# Patient Record
Sex: FEMALE | Race: BLACK OR AFRICAN AMERICAN | HISPANIC OR LATINO | Employment: FULL TIME | URBAN - METROPOLITAN AREA
[De-identification: names, ages, dates, MRNs, and addresses within clinical notes are randomized per-mention and may not be internally consistent; named-entity substitution may affect disease eponyms.]

---

## 2022-02-01 ENCOUNTER — TELEPHONE (OUTPATIENT)
Dept: OTHER | Facility: OTHER | Age: 27
End: 2022-02-01

## 2022-02-16 ENCOUNTER — OFFICE VISIT (OUTPATIENT)
Dept: FAMILY MEDICINE CLINIC | Facility: CLINIC | Age: 27
End: 2022-02-16
Payer: COMMERCIAL

## 2022-02-16 VITALS
WEIGHT: 194.8 LBS | BODY MASS INDEX: 31.31 KG/M2 | HEIGHT: 66 IN | TEMPERATURE: 99 F | HEART RATE: 75 BPM | DIASTOLIC BLOOD PRESSURE: 78 MMHG | RESPIRATION RATE: 16 BRPM | SYSTOLIC BLOOD PRESSURE: 124 MMHG | OXYGEN SATURATION: 98 %

## 2022-02-16 DIAGNOSIS — Z00.00 ANNUAL PHYSICAL EXAM: Primary | ICD-10-CM

## 2022-02-16 PROCEDURE — 3008F BODY MASS INDEX DOCD: CPT | Performed by: FAMILY MEDICINE

## 2022-02-16 PROCEDURE — 99385 PREV VISIT NEW AGE 18-39: CPT | Performed by: FAMILY MEDICINE

## 2022-02-16 PROCEDURE — 3725F SCREEN DEPRESSION PERFORMED: CPT | Performed by: FAMILY MEDICINE

## 2022-02-16 PROCEDURE — 1036F TOBACCO NON-USER: CPT | Performed by: FAMILY MEDICINE

## 2022-02-16 NOTE — PROGRESS NOTES
1 [facility]    NAME: Estella Gavin  AGE: 27 y o  SEX: female  : 1995     DATE: 2022     Assessment and Plan:     Problem List Items Addressed This Visit        Other    Annual physical exam - Primary     Annual physical exam completed at this time  Patient is overall doing well  Will obtain TSH, lipid panel, hemoglobin A1c, vitamin-D, CMP for evaluation         Relevant Orders    Hepatitis C antibody    HIV 1/2 Antigen/Antibody (4th Generation) w Reflex [facility]    BMI 31 0-31 9,adult     Discussed lifestyle modification to achieve a healthy body weight  Patient is currently learning to cook, explaining specific regarding portion control and serving size  Obtain blood work for evaluation of etiology other than color excess for weight gain         Relevant Orders    TSH, 3rd generation with Free T4 reflex    Comprehensive metabolic panel    HEMOGLOBIN A1C W/ EAG ESTIMATION    Lipid Panel with Direct LDL reflex    Vitamin D 25 hydroxy          Immunizations and preventive care screenings were discussed with patient today  Appropriate education was printed on patient's after visit summary  Counseling:  Alcohol/drug use: discussed moderation in alcohol intake, the recommendations for healthy alcohol use, and avoidance of illicit drug use  Dental Health: discussed importance of regular tooth brushing, flossing, and dental visits  Injury prevention: discussed safety/seat belts, safety helmets, smoke detectors, carbon dioxide detectors, and smoking near bedding or upholstery  Sexual health: discussed sexually transmitted diseases, partner selection, use of condoms, avoidance of unintended pregnancy, and contraceptive alternatives  · Exercise: the importance of regular exercise/physical activity was discussed  Recommend exercise 3-5 times per week for at least 30 minutes  BMI Counseling: Body mass index is 31 44 kg/m²   The BMI is above normal  Nutrition recommendations include decreasing portion sizes, encouraging healthy choices of fruits and vegetables, consuming healthier snacks and moderation in carbohydrate intake  Exercise recommendations include exercising 3-5 times per week  Rationale for BMI follow-up plan is due to patient being overweight or obese  Depression Screening and Follow-up Plan: Patient was screened for depression during today's encounter  They screened negative with a PHQ-2 score of 0  Patient report having received HPV vaccine in the past    No follow-ups on file  Chief Complaint:     Chief Complaint   Patient presents with    [facility]      History of Present Illness:     Adult Annual Physical   Patient here for a comprehensive physical exam  The patient reports no problems  Diet and Physical Activity  · Diet/Nutrition: limited junk food, consuming 3-5 servings of fruits/vegetables daily and High carb and lots of pasta and sandwiches  · Exercise: 1-2 times a week on average and 30-60 minutes on average  Depression Screening  PHQ-2/9 Depression Screening    Little interest or pleasure in doing things: 0 - not at all  Feeling down, depressed, or hopeless: 0 - not at all  PHQ-2 Score: 0  PHQ-2 Interpretation: Negative depression screen       General Health  · Sleep: sleeps well  · Hearing: normal - bilateral   · Vision: no vision problems, most recent eye exam >1 year ago and wears glasses  · Dental: regular dental visits and brushes teeth twice daily  /GYN Health  · Last menstrual period: 2/10/22, lasts 5 days, starts heavy, regular  · Contraceptive method: none, open to get pregnant  · History of STDs?: no      Review of Systems:     Review of Systems   Constitutional: Negative for appetite change, chills, fever and unexpected weight change  HENT: Negative for congestion, rhinorrhea, sore throat and trouble swallowing      Respiratory: Negative for cough, choking and shortness of breath  Cardiovascular: Negative for chest pain and palpitations  Gastrointestinal: Negative for abdominal pain, blood in stool, constipation, diarrhea, nausea and vomiting  Genitourinary: Negative for dysuria, hematuria, vaginal bleeding and vaginal discharge  Musculoskeletal: Negative for arthralgias, back pain and joint swelling  Skin: Negative for rash and wound  Neurological: Positive for headaches  Negative for dizziness and light-headedness  Stress related headaches  Denies any nighttime awakening secondary to headache   Psychiatric/Behavioral: Negative for sleep disturbance        Past Medical History:     Past Medical History:   Diagnosis Date    Dengue fever     around age 11-7   Patti Belch Obesity       Past Surgical History:     Past Surgical History:   Procedure Laterality Date    TOOTH EXTRACTION Bilateral       Social History:     Social History     Socioeconomic History    Marital status: Single     Spouse name: None    Number of children: None    Years of education: None    Highest education level: None   Occupational History    None   Tobacco Use    Smoking status: Never Smoker    Smokeless tobacco: Never Used   Vaping Use    Vaping Use: Never used   Substance and Sexual Activity    Alcohol use: Not Currently     Comment: holidays    Drug use: Never    Sexual activity: Yes     Partners: Male     Birth control/protection: None     Comment: possibly pregnant   Other Topics Concern    None   Social History Narrative    Feels safe at work and home    Good access to transportation, food medicine    Patient has carbon monoxide detector and smoke detectors in the home, she always uses seatbelt, does not write motorcycle or bicycle, no access to firearms     Social Determinants of Health     Financial Resource Strain: Not on file   Food Insecurity: Not on file   Transportation Needs: Not on file   Physical Activity: Not on file   Stress: Not on file   Social Connections: Not on file   Intimate Partner Violence: Not on file   Housing Stability: Not on file      Family History:     Family History   Problem Relation Age of Onset    COPD Mother     Hyperlipidemia Father     No Known Problems Sister     No Known Problems Brother     Diabetes Maternal Grandmother     Coronary artery disease Maternal Grandmother     No Known Problems Maternal Grandfather     No Known Problems Paternal Grandmother       Current Medications:     No current outpatient medications on file  No current facility-administered medications for this visit  Allergies:     Not on File   Physical Exam:     /78 (BP Location: Left arm, Patient Position: Sitting, Cuff Size: Large)   Pulse 75   Temp 99 °F (37 2 °C)   Resp 16   Ht 5' 6" (1 676 m)   Wt 88 4 kg (194 lb 12 8 oz)   SpO2 98%   BMI 31 44 kg/m²     Physical Exam  Vitals reviewed  Constitutional:       General: She is not in acute distress  Appearance: Normal appearance  She is obese  She is not ill-appearing, toxic-appearing or diaphoretic  Cardiovascular:      Rate and Rhythm: Normal rate and regular rhythm  Pulses: Normal pulses  Heart sounds: Normal heart sounds  Pulmonary:      Effort: Pulmonary effort is normal  No respiratory distress  Breath sounds: Normal breath sounds  Abdominal:      General: Abdomen is flat  Bowel sounds are normal  There is no distension  Palpations: Abdomen is soft  Musculoskeletal:         General: No swelling or tenderness  Normal range of motion  Skin:     General: Skin is warm and dry  Capillary Refill: Capillary refill takes less than 2 seconds  Coloration: Skin is not jaundiced  Neurological:      General: No focal deficit present  Mental Status: She is alert and oriented to person, place, and time     Psychiatric:         Mood and Affect: Mood normal              MD Ritchie Griffin

## 2022-02-16 NOTE — ASSESSMENT & PLAN NOTE
Discussed lifestyle modification to achieve a healthy body weight  Patient is currently learning to cook, explaining specific regarding portion control and serving size  Obtain blood work for evaluation of etiology other than color excess for weight gain

## 2022-02-16 NOTE — ASSESSMENT & PLAN NOTE
Annual physical exam completed at this time  Patient is overall doing well  Will obtain TSH, lipid panel, hemoglobin A1c, vitamin-D, CMP for evaluation

## 2022-02-16 NOTE — PATIENT INSTRUCTIONS

## 2022-03-01 LAB
25(OH)D3+25(OH)D2 SERPL-MCNC: 19.5 NG/ML (ref 30–100)
ALBUMIN SERPL-MCNC: 4.5 G/DL (ref 3.9–5)
ALBUMIN/GLOB SERPL: 1.7 {RATIO} (ref 1.2–2.2)
ALP SERPL-CCNC: 77 IU/L (ref 44–121)
ALT SERPL-CCNC: 26 IU/L (ref 0–32)
AST SERPL-CCNC: 24 IU/L (ref 0–40)
BILIRUB SERPL-MCNC: <0.2 MG/DL (ref 0–1.2)
BUN SERPL-MCNC: 10 MG/DL (ref 6–20)
BUN/CREAT SERPL: 10 (ref 9–23)
CALCIUM SERPL-MCNC: 9.7 MG/DL (ref 8.7–10.2)
CHLORIDE SERPL-SCNC: 105 MMOL/L (ref 96–106)
CHOLEST SERPL-MCNC: 184 MG/DL (ref 100–199)
CO2 SERPL-SCNC: 22 MMOL/L (ref 20–29)
CREAT SERPL-MCNC: 0.98 MG/DL (ref 0.57–1)
EGFR: 83 ML/MIN/1.73
EST. AVERAGE GLUCOSE BLD GHB EST-MCNC: 111 MG/DL
GLOBULIN SER-MCNC: 2.7 G/DL (ref 1.5–4.5)
GLUCOSE SERPL-MCNC: 88 MG/DL (ref 65–99)
HBA1C MFR BLD: 5.5 % (ref 4.8–5.6)
HCV AB S/CO SERPL IA: <0.1 S/CO RATIO (ref 0–0.9)
HDLC SERPL-MCNC: 46 MG/DL
HIV 1+2 AB+HIV1 P24 AG SERPL QL IA: NON REACTIVE
LDLC SERPL CALC-MCNC: 103 MG/DL (ref 0–99)
POTASSIUM SERPL-SCNC: 4.3 MMOL/L (ref 3.5–5.2)
PROT SERPL-MCNC: 7.2 G/DL (ref 6–8.5)
SODIUM SERPL-SCNC: 141 MMOL/L (ref 134–144)
T4 FREE SERPL-MCNC: 1.13 NG/DL (ref 0.82–1.77)
TRIGL SERPL-MCNC: 202 MG/DL (ref 0–149)
TSH SERPL DL<=0.005 MIU/L-ACNC: 2.73 UIU/ML (ref 0.45–4.5)

## 2022-03-23 ENCOUNTER — OFFICE VISIT (OUTPATIENT)
Dept: FAMILY MEDICINE CLINIC | Facility: CLINIC | Age: 27
End: 2022-03-23
Payer: COMMERCIAL

## 2022-03-23 VITALS
HEART RATE: 73 BPM | HEIGHT: 66 IN | WEIGHT: 198 LBS | DIASTOLIC BLOOD PRESSURE: 72 MMHG | OXYGEN SATURATION: 99 % | SYSTOLIC BLOOD PRESSURE: 106 MMHG | BODY MASS INDEX: 31.82 KG/M2 | TEMPERATURE: 97.8 F

## 2022-03-23 DIAGNOSIS — E66.09 CLASS 1 OBESITY DUE TO EXCESS CALORIES WITHOUT SERIOUS COMORBIDITY WITH BODY MASS INDEX (BMI) OF 31.0 TO 31.9 IN ADULT: Primary | ICD-10-CM

## 2022-03-23 DIAGNOSIS — E55.9 VITAMIN D DEFICIENCY: ICD-10-CM

## 2022-03-23 PROCEDURE — 99214 OFFICE O/P EST MOD 30 MIN: CPT | Performed by: FAMILY MEDICINE

## 2022-03-23 PROCEDURE — 3725F SCREEN DEPRESSION PERFORMED: CPT | Performed by: FAMILY MEDICINE

## 2022-03-23 PROCEDURE — 3008F BODY MASS INDEX DOCD: CPT | Performed by: FAMILY MEDICINE

## 2022-03-23 PROCEDURE — 1036F TOBACCO NON-USER: CPT | Performed by: FAMILY MEDICINE

## 2022-03-23 NOTE — PROGRESS NOTES
Assessment/Plan:    Obesity  Mild triglyceridemia and hyperlipidemia  This can be managed with lifestyle change in diet  Patient may use niacin supplement decreased triglyceride level  Increased exercise can lower your LDL    Goal weight of 185 lb by or next visit in 3 months    Vitamin D deficiency  Please continue vitamin-D supplement, 63097 units weekly  Return in 3 months for recheck      Subjective:      Patient ID: Marcio Ramsey is a 27 y o  female  HPI    26 year patient presents for follow-up regarding blood work  Blood work from 02/28/2022 reviewed together with patient  Blood work from 02/28/2022 including hep C, HIV, vitamin-D, hemoglobin A1c, lipid panel, CMP, TSH review together with patient  Patient does have mild vitamin-D deficiency as well as mild elevation triglyceride and LDL  Lab work otherwise normal       Review of Systems   Constitutional: Negative for appetite change, chills and fever  HENT: Negative for congestion, rhinorrhea and sore throat  Cardiovascular: Negative for chest pain  Gastrointestinal: Negative for abdominal pain  Neurological: Negative for headaches  Psychiatric/Behavioral: Negative for sleep disturbance  Objective:    /72 (BP Location: Right arm, Patient Position: Sitting, Cuff Size: Standard)   Pulse 73   Temp 97 8 °F (36 6 °C) (Temporal)   Ht 5' 6" (1 676 m)   Wt 89 8 kg (198 lb)   SpO2 99%   BMI 31 96 kg/m²     Physical Exam  Vitals reviewed  Constitutional:       General: She is not in acute distress  Appearance: Normal appearance  She is obese  She is not ill-appearing or toxic-appearing  Cardiovascular:      Rate and Rhythm: Normal rate  Pulses: Normal pulses  Heart sounds: Normal heart sounds  No murmur heard  Pulmonary:      Effort: Pulmonary effort is normal  No respiratory distress  Breath sounds: Normal breath sounds  Abdominal:      General: Abdomen is flat   Bowel sounds are normal       Palpations: Abdomen is soft  Musculoskeletal:         General: No swelling, tenderness, deformity or signs of injury  Normal range of motion  Skin:     General: Skin is warm and dry  Capillary Refill: Capillary refill takes less than 2 seconds  Coloration: Skin is not jaundiced  Neurological:      General: No focal deficit present  Mental Status: She is alert and oriented to person, place, and time  Psychiatric:         Mood and Affect: Mood normal               ISAK Boyce  Family Medicine    Please excuse any "sound-alike" errors that may have ocurred during the process of dictation  Parts of this note have been dictated and there may be errors present in the transcription process  Thank you

## 2022-03-23 NOTE — ASSESSMENT & PLAN NOTE
Mild triglyceridemia and hyperlipidemia  This can be managed with lifestyle change in diet  Patient may use niacin supplement decreased triglyceride level  Increased exercise can lower your LDL    Goal weight of 185 lb by or next visit in 3 months

## 2024-04-16 ENCOUNTER — TELEPHONE (OUTPATIENT)
Dept: FAMILY MEDICINE CLINIC | Facility: CLINIC | Age: 29
End: 2024-04-16

## 2025-02-27 ENCOUNTER — OFFICE VISIT (OUTPATIENT)
Dept: OBGYN CLINIC | Facility: CLINIC | Age: 30
End: 2025-02-27

## 2025-02-27 VITALS
DIASTOLIC BLOOD PRESSURE: 74 MMHG | HEIGHT: 66 IN | SYSTOLIC BLOOD PRESSURE: 104 MMHG | BODY MASS INDEX: 30.86 KG/M2 | WEIGHT: 192 LBS | HEART RATE: 83 BPM

## 2025-02-27 DIAGNOSIS — O09.299 HISTORY OF SHOULDER DYSTOCIA IN PRIOR PREGNANCY, CURRENTLY PREGNANT: ICD-10-CM

## 2025-02-27 DIAGNOSIS — Z32.01 POSITIVE PREGNANCY TEST: ICD-10-CM

## 2025-02-27 DIAGNOSIS — Z3A.08 8 WEEKS GESTATION OF PREGNANCY: Primary | ICD-10-CM

## 2025-02-27 NOTE — ASSESSMENT & PLAN NOTE
"Patient reports her last baby \"got stuck at the shoulder or something\"  She reports it wasn't long and no invasive interventions were necessary   That child has no deficits  She will require counseling on mode of delivery throughout pregnancy care  "

## 2025-02-27 NOTE — PROGRESS NOTES
"[facility]  Obstetrics & Gynecology  2025    S: 29 y.o.  who presents for viability scan. Her LMP was 25 and she is 8 weeks and 4 days by her LMP. She reports some mild cramping and nausea. She denies vaginal bleeding. This pregnancy was planned. Previous pregnancies: uncomplicated pregnancy that resulted in an  in 2023 complicated by possible SD. She remembers the baby getting stuck near the shoulders. She doesn't remember what maneuvers they did but states the baby wasn't stuck long. That child has no deficits. Medical problems include: none. Current medications: prenatal vitamin. Smoking/drinking/illicit drug use: denies.     [place] age 10 [place] 11     Shx: wisdom teeth       O:  Vitals:    25 1318   BP: 104/74   BP Location: Left arm   Patient Position: Sitting   Pulse: 83   Weight: 87.1 kg (192 lb)   Height: 5' 6\" (1.676 m)     Body mass index is 30.99 kg/m².    Imaging:  First Trimester Ultrasound  Indication: Amenorrhea, viability/dating  Comparison: None.  Technique: Transvaginal imaging was performed to assess the gestation, myometrial/endometrial architecture and ovarian parenchymal detail  Findings: A single intrauterine gestation is identified with cardiac activity is detected at 185 bpm. Yolk sac is present and normal in size and appearance. Mean crown rump length is 21.8 mm = 8 weeks 5 days. Amniotic fluid appears normal. No adnexal masses or pathologic cysts identified. No free fluid.  Impression: Ultrasound dating consistent with LMP. Final TRACY of 10/6/2025 (by LMP).                   A/P:  Problem List Items Addressed This Visit       History of shoulder dystocia in prior pregnancy, currently pregnant    Patient reports her last baby \"got stuck at the shoulder or something\"  She reports it wasn't long and no invasive interventions were necessary   That child has no deficits  She will require counseling on mode of " delivery throughout pregnancy care         8 weeks gestation of pregnancy - Primary    Viable healy IUP  Prenatal labs ordered  MFM referral placed  Will need pap at prenatal H&P           Relevant Orders    AMB US OB < 14 weeks single or first gestation level 1 (Completed)    HIV 1/2 AG/AB w Reflex [facility] for 2 yr old and above    Hepatitis C antibody    UA (URINE) with reflex to Scope    Urine culture    Hepatitis B surface antigen    CBC and differential    Rubella antibody, IgG    Type and screen    RPR-Syphilis Screening (Total Syphilis IGG/IGM)    Hepatitis B surface antibody    Anemia Panel w/Reflex, OB    Ambulatory Referral to Maternal Fetal Medicine     Other Visit Diagnoses         Positive pregnancy test        Relevant Orders    AMB US OB < 14 weeks single or first gestation level 1 (Completed)    HIV 1/2 AG/AB w Reflex [facility] for 2 yr old and above    Hepatitis C antibody    UA (URINE) with reflex to Scope    Urine culture    Hepatitis B surface antigen    CBC and differential    Rubella antibody, IgG    Type and screen    RPR-Syphilis Screening (Total Syphilis IGG/IGM)    Hepatitis B surface antibody    Anemia Panel w/Reflex, OB    Ambulatory Referral to Maternal Fetal Medicine            Patient discussed with Dr. Banda.    Terri Millan MD  PGY-II, OB/GYN  2/27/2025, 2:10 PM

## 2025-03-05 ENCOUNTER — INITIAL PRENATAL (OUTPATIENT)
Dept: OBGYN CLINIC | Facility: CLINIC | Age: 30
End: 2025-03-05

## 2025-03-05 ENCOUNTER — PATIENT OUTREACH (OUTPATIENT)
Dept: OBGYN CLINIC | Facility: CLINIC | Age: 30
End: 2025-03-05

## 2025-03-05 DIAGNOSIS — O09.299 HISTORY OF SHOULDER DYSTOCIA IN PRIOR PREGNANCY, CURRENTLY PREGNANT: Primary | ICD-10-CM

## 2025-03-05 PROCEDURE — 99211 OFF/OP EST MAY X REQ PHY/QHP: CPT

## 2025-03-05 NOTE — LETTER
[facility] Letter    03/05/25      Yaneth Sharma  1995  [address] [facility] [address]       Yaneth Sharma is a patient and under our care in our office. Yaneth Sharma's Estimated Date of Delivery: 10/6/2025  Any questions or concerns feel free to contact our office.       Respectfully,    [facility] OBGYN Care Team      [facility]  [phone]  [facility]/[place]  [phone]    [facility]/[place]  [phone]   [facility]  [phone]    [facility]   [phone]    [facility]  [phone]

## 2025-03-05 NOTE — LETTER
Dentist Letter            03/05/25          Yaneth Sharma  1995  [address]  [facility] [address]               We have had several requests from local dentist requesting permission to perform procedures on our patients who are pregnant. We wish to respond with this letter regarding some of the more routine procedures that we have been asked about.    The following procedures may be performed on our obstetric patients:   1. Administration of local anesthesia   2. Administration of antibiotics such as PCN, Ampicillin, and Erythromycin.   3. Administration of pain medications such as Tylenol, Tylenol with codeine, and if needed Percocet.   4. Shielded X-rays    Should you have any questions, please do not hesitate to contact at [phone].        Sincerely,    [facility] OBGYN Care Team  [phone]

## 2025-03-05 NOTE — PROGRESS NOTES
TESFAYE HERBERT was referred to complete a PN SW assessment. Pt is 29 y.o.  single female. Pt GA is Unknown and  Estimated Date of Delivery: None noted.. Pt primary language is English.     Pt reports this to be a planned and welcomed pregnancy. Pt and her fiance are very happy and they have their family support. Pt reports stable housing and denies financial difficulty, food insecurity, transportation concern, JOSE and MH. Pt is working full time from home. FOB is seeking employment at this time. Pt reports to have baby supplies if  is a boy. Pt denies concerns for baby supplies if a girl. Pt reports to have a pack and play to use for safe sleep.     TESFAYE CM encouraged pt to contact TESFAYE HERBERT for additional support if needed. TESFAYE HERBERT will otherwise close this referral at this time.

## 2025-03-05 NOTE — LETTER
[facility] Letter    03/05/25      Yaneth Sharma  1995  [address] [facility] [address] [facility] [address]       Yaneth Sharma is a patient and under our care in our office. Yaneth Sharma's Estimated Date of Delivery: None noted..  Any questions or concerns feel free to contact our office.       Respectfully,    [facility] OBGYN Care Team      [facility]/[place]  [phone]  [facility]/[place]  [phone]    [facility]/[place]  [phone]   [facility]  [phone]    [facility]   [phone]    [facility]  [phone]

## 2025-03-05 NOTE — PROGRESS NOTES
OB INTAKE INTERVIEW  Pt presents for OB intake.     OB History    Para Term  AB Living   2 1 1 0 0 1   SAB IAB Ectopic Multiple Live Births   0 0 0  1      # Outcome Date GA Lbr Jose/2nd Weight Sex Type Anes PTL Lv   2 Current            1 Term 23 39w6d / 00:52 3772 g (8 lb 5.1 oz) M Vag-Spont EPI N STANLEY      Birth Comments: mec delivery; tongue-tied; Syriac to buttocks      Complications: Shoulder Dystocia     Hx of  delivery prior to 36 weeks 6 days:  No   If yes, place a referral for cervical surveillance at 16 weeks.   Last Menstrual Period:    Patient's last menstrual period was 2024 (exact date).     Ultrasound date: 2025  8 weeks 5 days     Estimated Date of Delivery: None noted.   by LMP  H&P visit scheduled. 3/20/2025 @ 1500  with Dr. High     Last pap smear: unknown date      Current Issues:  Constipation :   Yes, denied blood. RN educated pt on dietary changes   Headaches :   No  Cramping:  Yes  Spotting :   No  PICA cravings :  No  FOB Involved:   Yes  Planned pregnancy:  Yes  I have these concerns about this prenatal patient:   History of Shoulder dystocia in previous pregnancy   Referral ordered for SWCM and NFP     Interview education  [facility] Pregnancy Essentials reviewed and discussed   [facility] Handout  [facility] MFM Handout  Discussed genetic testing  Prenatal lab work: Scripts printed and given to pt.   Influenza vaccine given today: No  Discussed Tdap vaccine.   Immunizations:   There is no immunization history on file for this patient.  Depression Screening Follow-up Plan: Patient's depression screening was negative with an [place] score of  3  Clinically patient does not have depression. No treatment is required..  Nurse/Family Partnership- referral placed:  Yes   If yes, place referral for nurse family partnership  BMI Counseling: There is no height or weight on file to calculate BMI.   Tobacco Cessation Counseling:  non-smoker   The numerous health risks of tobacco consumption were discussed. .  Infection Screening: Does the pt have a hx of MRSA? No  If yes- please follow MRSA protocol and obtain a nasal swab for MRSA culture  The patient was oriented to our practice and all questions were answered.  Interviewed by: Rose Marie Medellin RN 03/05/25

## 2025-03-05 NOTE — LETTER
Proof of Pregnancy Letter      03/05/25            Yaneth Sharma  1995  [address] [place] [id number]      Yaneth Sharma is a patient at our facility. Yaneth Sharma Estimated Date of Delivery: None noted.       Any questions or concerns, please feel free to contact our office.      Sincerely,      [facility]  [address] [place] [address], [address]  [place] [address]  Office:  [phone]

## 2025-03-05 NOTE — LETTER
Work Letter    03/05/25      Yaneth Sharma  1995  [address] [address]    Dear Yaneth Sharma,      Your employee is a patient at [facility] .    We recommend that all pregnant women:    1. Have a well-ventilated workspace.  2. Wear low-heeled shoes.  3. Work no more than 40 hours per week.  4. Have a 15 minute break every 2 hours and at least 30 minutes for a meal break.   5. Use good body mechanics by bending at your knees to avoid back strain and lift no more than 20 pounds without assistance. Will need assistance with lifting over 20 lbs.   6. Have ready access to bathrooms and water.      She may continue to work until her due date unless medical complications arise. We anticipate she may return to work in 6-8 weeks after delivery.     Sincerely,  [facility]  [address], [address]  [place]  [address]  OFFICE:  [phone]    OR  [address] [place]  [address]  OFFICE:  [phone]

## 2025-03-05 NOTE — LETTER
Proof of Pregnancy Letter      03/05/25            Yaneth Sharma  1995  [address] [place] [address]      Yaneth Sharma is a patient at our facility. Yaneth Sharma Estimated Date of Delivery: 10/6/2025.       Any questions or concerns, please feel free to contact our office.      Sincerely,      [facility]  [address] [place] [address], [address]  [place] [address]  Office:  [phone]

## 2025-03-07 ENCOUNTER — HOSPITAL ENCOUNTER (EMERGENCY)
Facility: HOSPITAL | Age: 30
Discharge: HOME | End: 2025-03-07
Attending: EMERGENCY MEDICINE
Payer: COMMERCIAL

## 2025-03-07 ENCOUNTER — TELEPHONE (OUTPATIENT)
Dept: OBGYN CLINIC | Facility: CLINIC | Age: 30
End: 2025-03-07

## 2025-03-07 VITALS
DIASTOLIC BLOOD PRESSURE: 79 MMHG | HEART RATE: 84 BPM | RESPIRATION RATE: 16 BRPM | TEMPERATURE: 97.7 F | OXYGEN SATURATION: 99 % | SYSTOLIC BLOOD PRESSURE: 148 MMHG

## 2025-03-07 DIAGNOSIS — N93.9 VAGINAL BLEEDING: ICD-10-CM

## 2025-03-07 DIAGNOSIS — O20.0 THREATENED MISCARRIAGE: Primary | ICD-10-CM

## 2025-03-07 LAB
ABO GROUP BLD: NORMAL
ALBUMIN SERPL BCG-MCNC: 4 G/DL (ref 3.5–5)
ALP SERPL-CCNC: 52 U/L (ref 34–104)
ALT SERPL W P-5'-P-CCNC: 11 U/L (ref 7–52)
ANION GAP SERPL CALCULATED.3IONS-SCNC: 7 MMOL/L (ref 4–13)
AST SERPL W P-5'-P-CCNC: 15 U/L (ref 13–39)
B-HCG SERPL-ACNC: ABNORMAL MIU/ML (ref 0–5)
BACTERIA UR QL AUTO: NORMAL /HPF
BASOPHILS # BLD AUTO: 0.04 THOUSANDS/ÂΜL (ref 0–0.1)
BASOPHILS NFR BLD AUTO: 0 % (ref 0–1)
BILIRUB SERPL-MCNC: 0.36 MG/DL (ref 0.2–1)
BILIRUB UR QL STRIP: NEGATIVE
BLD GP AB SCN SERPL QL: NEGATIVE
BUN SERPL-MCNC: 11 MG/DL (ref 5–25)
CALCIUM SERPL-MCNC: 9.6 MG/DL (ref 8.4–10.2)
CHLORIDE SERPL-SCNC: 104 MMOL/L (ref 96–108)
CLARITY UR: CLEAR
CO2 SERPL-SCNC: 23 MMOL/L (ref 21–32)
COLOR UR: ABNORMAL
CREAT SERPL-MCNC: 0.66 MG/DL (ref 0.6–1.3)
EOSINOPHIL # BLD AUTO: 0.23 THOUSAND/ÂΜL (ref 0–0.61)
EOSINOPHIL NFR BLD AUTO: 2 % (ref 0–6)
ERYTHROCYTE [DISTWIDTH] IN BLOOD BY AUTOMATED COUNT: 13.5 % (ref 11.6–15.1)
GFR SERPL CREATININE-BSD FRML MDRD: 121 ML/MIN/1.73SQ M
GLUCOSE SERPL-MCNC: 77 MG/DL (ref 65–140)
GLUCOSE UR STRIP-MCNC: NEGATIVE MG/DL
HCT VFR BLD AUTO: 38.2 % (ref 34.8–46.1)
HGB BLD-MCNC: 12.9 G/DL (ref 11.5–15.4)
HGB UR QL STRIP.AUTO: ABNORMAL
IMM GRANULOCYTES # BLD AUTO: 0.03 THOUSAND/UL (ref 0–0.2)
IMM GRANULOCYTES NFR BLD AUTO: 0 % (ref 0–2)
KETONES UR STRIP-MCNC: NEGATIVE MG/DL
LEUKOCYTE ESTERASE UR QL STRIP: NEGATIVE
LYMPHOCYTES # BLD AUTO: 2.31 THOUSANDS/ÂΜL (ref 0.6–4.47)
LYMPHOCYTES NFR BLD AUTO: 23 % (ref 14–44)
MCH RBC QN AUTO: 28.8 PG (ref 26.8–34.3)
MCHC RBC AUTO-ENTMCNC: 33.8 G/DL (ref 31.4–37.4)
MCV RBC AUTO: 85 FL (ref 82–98)
MONOCYTES # BLD AUTO: 0.84 THOUSAND/ÂΜL (ref 0.17–1.22)
MONOCYTES NFR BLD AUTO: 8 % (ref 4–12)
NEUTROPHILS # BLD AUTO: 6.69 THOUSANDS/ÂΜL (ref 1.85–7.62)
NEUTS SEG NFR BLD AUTO: 67 % (ref 43–75)
NITRITE UR QL STRIP: NEGATIVE
NON-SQ EPI CELLS URNS QL MICRO: NORMAL /HPF
NRBC BLD AUTO-RTO: 0 /100 WBCS
PH UR STRIP.AUTO: 6.5 [PH]
PLATELET # BLD AUTO: 253 THOUSANDS/UL (ref 149–390)
PMV BLD AUTO: 10.2 FL (ref 8.9–12.7)
POTASSIUM SERPL-SCNC: 3.9 MMOL/L (ref 3.5–5.3)
PROT SERPL-MCNC: 7.1 G/DL (ref 6.4–8.4)
PROT UR STRIP-MCNC: NEGATIVE MG/DL
RBC # BLD AUTO: 4.48 MILLION/UL (ref 3.81–5.12)
RBC #/AREA URNS AUTO: NORMAL /HPF
RH BLD: POSITIVE
SODIUM SERPL-SCNC: 134 MMOL/L (ref 135–147)
SP GR UR STRIP.AUTO: 1.01 (ref 1–1.03)
SPECIMEN EXPIRATION DATE: NORMAL
UROBILINOGEN UR STRIP-ACNC: <2 MG/DL
WBC # BLD AUTO: 10.14 THOUSAND/UL (ref 4.31–10.16)
WBC #/AREA URNS AUTO: NORMAL /HPF

## 2025-03-07 PROCEDURE — 81001 URINALYSIS AUTO W/SCOPE: CPT

## 2025-03-07 PROCEDURE — 84702 CHORIONIC GONADOTROPIN TEST: CPT

## 2025-03-07 PROCEDURE — 86901 BLOOD TYPING SEROLOGIC RH(D): CPT

## 2025-03-07 PROCEDURE — 99284 EMERGENCY DEPT VISIT MOD MDM: CPT

## 2025-03-07 PROCEDURE — 80053 COMPREHEN METABOLIC PANEL: CPT

## 2025-03-07 PROCEDURE — 87086 URINE CULTURE/COLONY COUNT: CPT

## 2025-03-07 PROCEDURE — 85025 COMPLETE CBC W/AUTO DIFF WBC: CPT

## 2025-03-07 PROCEDURE — 86850 RBC ANTIBODY SCREEN: CPT

## 2025-03-07 PROCEDURE — 99284 EMERGENCY DEPT VISIT MOD MDM: CPT | Performed by: EMERGENCY MEDICINE

## 2025-03-07 PROCEDURE — 36415 COLL VENOUS BLD VENIPUNCTURE: CPT

## 2025-03-07 PROCEDURE — 86900 BLOOD TYPING SEROLOGIC ABO: CPT

## 2025-03-07 NOTE — ED PROVIDER NOTES
Time reflects when diagnosis was documented in both MDM as applicable and the Disposition within this note       Time User Action Codes Description Comment    3/7/2025  1:11 PM Dmitry Baum Add [N93.9] Vaginal bleeding     3/7/2025  1:11 PM Dmitry Baum Add [O20.0] Threatened miscarriage     3/7/2025  1:11 PM Dmitry Baum Modify [N93.9] Vaginal bleeding     3/7/2025  1:11 PM Dmitry Baum Modify [O20.0] Threatened miscarriage           ED Disposition       ED Disposition   Discharge    Condition   Stable    Date/Time   Fri Mar 7, 2025  1:12 PM    Comment   Yaneth Sharma discharge to home/self care.                   Assessment & Plan       Medical Decision Making  See ED course    Amount and/or Complexity of Data Reviewed  Labs: ordered. Decision-making details documented in ED Course.        ED Course as of 25 1410   Fri Mar 07, 2025   1100 29F  at 824d presenting for vaginal bleed. Had an ultrasound confirming intrauterine pregnancy on .  On presentation, denies any acute symptoms.  Noted that when she woke up this morning she had episode of vaginal bleeding for which states was slightly larger than trace amounts of blood.  No active hemorrhage.  States bleeding has slowed but not completely cleared.  No clots or passage of tissue.  Had intermittent abdominal cramping over the past 8 weeks but has not felt this for the past week.  Reached out to OB/GYN office who recommended she come be evaluated in the ED.   1134 DDx including but not limited to: threatened , missed , incomplete , anemia, coagulopathy.  Doubt ovarian torsion, ectopic pregnancy given normal recent intrauterine pregnancy confirmed by ultrasound.   1210 Fetal heart rate 161 on bedside ultrasound.   1225 HCG QUANTITATIVE(!): 129,309.5  Normal for expected date of preg   1409 Rh positive.  No indication for RhoGAM.   1410 Discharged in stable addition.  Recommended outpatient follow-up.       Medications - No data  "to display    ED Risk Strat Scores                                                History of Present Illness       Chief Complaint   Patient presents with    Vaginal Bleeding - Pregnant     Pt woke up today with vaginal bleeding, about 9 weeks pregnant. Pt denies any pain/cramping. Pt denies passing any clots. Pt states the blood is \"more than spotting\".        Past Medical History:   Diagnosis Date    Dengue fever     around age 8-9    Obesity       Past Surgical History:   Procedure Laterality Date    TOOTH EXTRACTION Bilateral       Family History   Problem Relation Age of Onset    COPD Mother     Hyperlipidemia Father     No Known Problems Sister     No Known Problems Brother     No Known Problems Son     Diabetes Maternal Grandmother     Coronary artery disease Maternal Grandmother     No Known Problems Maternal Grandfather     No Known Problems Paternal Grandmother     Breast cancer Other     Colon cancer Neg Hx     Ovarian cancer Neg Hx       Social History     Tobacco Use    Smoking status: Never    Smokeless tobacco: Never   Vaping Use    Vaping status: Never Used   Substance Use Topics    Alcohol use: Not Currently     Comment: holidays    Drug use: Never      E-Cigarette/Vaping    E-Cigarette Use Never User       E-Cigarette/Vaping Substances      I have reviewed and agree with the history as documented.     29F  at 824d presenting for vaginal bleed. Had an ultrasound confirming intrauterine pregnancy on .  On presentation, denies any acute symptoms.  Noted that when she woke up this morning she had episode of vaginal bleeding for which states was slightly larger than trace amounts of blood.  No active hemorrhage.  States bleeding has slowed but not completely cleared.  No clots or passage of tissue.  Had intermittent abdominal cramping over the past 8 weeks but has not felt this for the past week.  Reached out to OB/GYN office who recommended she come be evaluated in the ED.          Review of " Systems   Constitutional:  Negative for chills and fever.   HENT:  Negative for congestion and rhinorrhea.    Respiratory:  Negative for cough and shortness of breath.    Cardiovascular:  Negative for chest pain.   Gastrointestinal:  Negative for abdominal pain, diarrhea, nausea and vomiting.   Genitourinary:  Positive for vaginal bleeding. Negative for dysuria, frequency, hematuria, urgency, vaginal discharge and vaginal pain.   Musculoskeletal:  Negative for back pain and neck pain.   Skin:  Negative for color change and rash.   Neurological:  Negative for syncope, weakness, numbness and headaches.           Objective       ED Triage Vitals [03/07/25 1103]   Temperature Pulse Blood Pressure Respirations SpO2 Patient Position - Orthostatic VS   97.7 °F (36.5 °C) 84 148/79 16 99 % Lying      Temp Source Heart Rate Source BP Location FiO2 (%) Pain Score    Oral -- Right arm -- No Pain      Vitals      Date and Time Temp Pulse SpO2 Resp BP Pain Score FACES Pain Rating User   03/07/25 1103 97.7 °F (36.5 °C) 84 99 % 16 148/79 No Pain -- CS            Physical Exam  Constitutional:       General: She is not in acute distress.     Appearance: Normal appearance. She is not toxic-appearing.   HENT:      Head: Normocephalic and atraumatic.      Nose: Nose normal.      Mouth/Throat:      Mouth: Mucous membranes are moist.   Eyes:      Extraocular Movements: Extraocular movements intact.      Pupils: Pupils are equal, round, and reactive to light.   Cardiovascular:      Rate and Rhythm: Normal rate and regular rhythm.   Pulmonary:      Effort: Pulmonary effort is normal. No respiratory distress.   Abdominal:      General: Abdomen is flat. There is no distension.      Palpations: There is no mass.      Tenderness: There is no abdominal tenderness. There is no guarding or rebound.   Musculoskeletal:         General: No swelling or tenderness. Normal range of motion.      Cervical back: Normal range of motion. No tenderness.    Skin:     General: Skin is warm.      Coloration: Skin is not jaundiced or pale.   Neurological:      General: No focal deficit present.      Mental Status: She is alert and oriented to person, place, and time.   Psychiatric:         Mood and Affect: Mood normal.         Behavior: Behavior normal.         Results Reviewed       Procedure Component Value Units Date/Time    hCG, quantitative [[id number]]  (Abnormal) Collected: 03/07/25 1133    Lab Status: Final result Specimen: Blood from Arm, Right Updated: 03/07/25 1224     HCG, Quant 129,309.5 mIU/mL     Narrative:       Expected Ranges:    HCG results between 5.0 and 25.0 mIU/mL may be indicative of early pregnancy but should be interpreted in light of the total clinical presentation.    HCG can rise to detectable levels in dani and post menopausal women (0-11.6 mIU/mL).     Approximate               Approximate HCG  Gestation age          Concentration ( mIU/mL)  _____________          ______________________   Weeks                      HCG values  0.2-1                       5-50  1-2                           2-3                         100-5000  3-4                         500-15683  4-5                         1000-12913  5-6                         07319-953990  6-8                         79869-891816  8-12                        40589-252613      Urine Microscopic [[id number]] Collected: 03/07/25 1154    Lab Status: Final result Specimen: Urine, Other Updated: 03/07/25 1217     RBC, UA 1-2 /hpf      WBC, UA 1-2 /hpf      Epithelial Cells Occasional /hpf      Bacteria, UA Occasional /hpf      URINE COMMENT --    UA w Reflex to Microscopic w Reflex to Culture [[id number]]  (Abnormal) Collected: 03/07/25 1154    Lab Status: Final result Specimen: Urine, Other Updated: 03/07/25 1216     Color, UA Light Yellow     Clarity, UA Clear     Specific Gravity, UA 1.012     pH, UA 6.5     Leukocytes, UA Negative     Nitrite, UA Negative     Protein, UA Negative  mg/dl      Glucose, UA Negative mg/dl      Ketones, UA Negative mg/dl      Urobilinogen, UA <2.0 mg/dl      Bilirubin, UA Negative     Occult Blood, UA Moderate     URINE COMMENT --    Urine culture [[id number]] Collected: 03/07/25 1154    Lab Status: In process Specimen: Urine, Other Updated: 03/07/25 1216    Comprehensive metabolic panel [[id number]]  (Abnormal) Collected: 03/07/25 1133    Lab Status: Final result Specimen: Blood from Arm, Right Updated: 03/07/25 1201     Sodium 134 mmol/L      Potassium 3.9 mmol/L      Chloride 104 mmol/L      CO2 23 mmol/L      ANION GAP 7 mmol/L      BUN 11 mg/dL      Creatinine 0.66 mg/dL      Glucose 77 mg/dL      Calcium 9.6 mg/dL      AST 15 U/L      ALT 11 U/L      Alkaline Phosphatase 52 U/L      Total Protein 7.1 g/dL      Albumin 4.0 g/dL      Total Bilirubin 0.36 mg/dL      eGFR 121 ml/min/1.73sq m     Narrative:      National Kidney Disease Foundation guidelines for Chronic Kidney Disease (CKD):     Stage 1 with normal or high GFR (GFR > 90 mL/min/1.73 square meters)    Stage 2 Mild CKD (GFR = 60-89 mL/min/1.73 square meters)    Stage 3A Moderate CKD (GFR = 45-59 mL/min/1.73 square meters)    Stage 3B Moderate CKD (GFR = 30-44 mL/min/1.73 square meters)    Stage 4 Severe CKD (GFR = 15-29 mL/min/1.73 square meters)    Stage 5 End Stage CKD (GFR <15 mL/min/1.73 square meters)  Note: GFR calculation is accurate only with a steady state creatinine    CBC and differential [[id number]] Collected: 03/07/25 1133    Lab Status: Final result Specimen: Blood from Arm, Right Updated: 03/07/25 1144     WBC 10.14 Thousand/uL      RBC 4.48 Million/uL      Hemoglobin 12.9 g/dL      Hematocrit 38.2 %      MCV 85 fL      MCH 28.8 pg      MCHC 33.8 g/dL      RDW 13.5 %      MPV 10.2 fL      Platelets 253 Thousands/uL      nRBC 0 /100 WBCs      Segmented % 67 %      Immature Grans % 0 %      Lymphocytes % 23 %      Monocytes % 8 %      Eosinophils Relative 2 %      Basophils Relative 0 %       Absolute Neutrophils 6.69 Thousands/µL      Absolute Immature Grans 0.03 Thousand/uL      Absolute Lymphocytes 2.31 Thousands/µL      Absolute Monocytes 0.84 Thousand/µL      Eosinophils Absolute 0.23 Thousand/µL      Basophils Absolute 0.04 Thousands/µL             No orders to display       Procedures    ED Medication and Procedure Management   Prior to Admission Medications   Prescriptions Last Dose Informant Patient Reported? Taking?   Prenatal Vit-Fe Fumarate-FA (PRENATAL 19 PO)   Yes No   Sig: Take by mouth      Facility-Administered Medications: None     Discharge Medication List as of 3/7/2025  1:12 PM        CONTINUE these medications which have NOT CHANGED    Details   Prenatal Vit-Fe Fumarate-FA (PRENATAL 19 PO) Take by mouth, Historical Med           No discharge procedures on file.  ED SEPSIS DOCUMENTATION   Time reflects when diagnosis was documented in both MDM as applicable and the Disposition within this note       Time User Action Codes Description Comment    3/7/2025  1:11 PM Dmitry Baum Add [N93.9] Vaginal bleeding     3/7/2025  1:11 PM Dmitry Baum Add [O20.0] Threatened miscarriage     3/7/2025  1:11 PM Dmitry Baum Modify [N93.9] Vaginal bleeding     3/7/2025  1:11 PM Dmitry Baum Modify [O20.0] Threatened miscarriage                  Dmitry Baum MD  03/07/25 1410

## 2025-03-07 NOTE — DISCHARGE INSTRUCTIONS
You are seen for miscarriage which is the general term for patients presenting for vaginal bleeding in the first trimester.    Please follow-up with your OB/GYN/PCP within the next week for further evaluation.  Return to ED with any worsening pain or concerning symptoms.

## 2025-03-07 NOTE — TELEPHONE ENCOUNTER
Pt who is 9w4d pregnant calls w/ complaints of vaginal bleeding, more reddish in color like a period. Advised pt be evaluated in [facility] ED. Pt verbalizes understanding.

## 2025-03-07 NOTE — ED ATTENDING ATTESTATION
3/7/2025  I, Dragan Mendoza MD, saw and evaluated the patient. I have discussed the patient with the resident/non-physician practitioner and agree with the resident's/non-physician practitioner's findings, Plan of Care, and MDM as documented in the resident's/non-physician practitioner's note, except where noted. All available labs and Radiology studies were reviewed.  I was present for key portions of any procedure(s) performed by the resident/non-physician practitioner and I was immediately available to provide assistance.       At this point I agree with the current assessment done in the Emergency Department.  I have conducted an independent evaluation of this patient a history and physical is as follows: Vaginal bleeding early pregnancy.  Had previous confirmed intrauterine pregnancy through her OB/GYN.  Bedside ultrasound today reveals intrauterine pregnancy with heart rate 161.  hCG appropriately elevated.  Rh+ on blood workup additional labs unremarkable.  No evidence of ectopic pregnancy given previous IUP, current IUP with fetal heart rate.  Long discussion with patient, everything looks as expected today, still possibility of miscarriage given early pregnancy.  She will follow-up as appropriate with her outpatient OB/GYN    Results Reviewed       Procedure Component Value Units Date/Time    hCG, quantitative [[id number]]  (Abnormal) Collected: 03/07/25 1133    Lab Status: Final result Specimen: Blood from Arm, Right Updated: 03/07/25 1224     HCG, Quant 129,309.5 mIU/mL     Narrative:       Expected Ranges:    HCG results between 5.0 and 25.0 mIU/mL may be indicative of early pregnancy but should be interpreted in light of the total clinical presentation.    HCG can rise to detectable levels in adni and post menopausal women (0-11.6 mIU/mL).     Approximate               Approximate HCG  Gestation age          Concentration ( mIU/mL)  _____________          ______________________   Weeks                       HCG values  0.2-1                       5-50  1-2                           2-3                         100-5000  3-4                         500-22211  4-5                         1000-22375  5-6                         55785-498432  6-8                         19266-268370  8-12                        84074-875291      Urine Microscopic [[id number]] Collected: 03/07/25 1154    Lab Status: Final result Specimen: Urine, Other Updated: 03/07/25 1217     RBC, UA 1-2 /hpf      WBC, UA 1-2 /hpf      Epithelial Cells Occasional /hpf      Bacteria, UA Occasional /hpf      URINE COMMENT --    UA w Reflex to Microscopic w Reflex to Culture [[id number]]  (Abnormal) Collected: 03/07/25 1154    Lab Status: Final result Specimen: Urine, Other Updated: 03/07/25 1216     Color, UA Light Yellow     Clarity, UA Clear     Specific Gravity, UA 1.012     pH, UA 6.5     Leukocytes, UA Negative     Nitrite, UA Negative     Protein, UA Negative mg/dl      Glucose, UA Negative mg/dl      Ketones, UA Negative mg/dl      Urobilinogen, UA <2.0 mg/dl      Bilirubin, UA Negative     Occult Blood, UA Moderate     URINE COMMENT --    Urine culture [[id number]] Collected: 03/07/25 1154    Lab Status: In process Specimen: Urine, Other Updated: 03/07/25 1216    Comprehensive metabolic panel [[id number]]  (Abnormal) Collected: 03/07/25 1133    Lab Status: Final result Specimen: Blood from Arm, Right Updated: 03/07/25 1201     Sodium 134 mmol/L      Potassium 3.9 mmol/L      Chloride 104 mmol/L      CO2 23 mmol/L      ANION GAP 7 mmol/L      BUN 11 mg/dL      Creatinine 0.66 mg/dL      Glucose 77 mg/dL      Calcium 9.6 mg/dL      AST 15 U/L      ALT 11 U/L      Alkaline Phosphatase 52 U/L      Total Protein 7.1 g/dL      Albumin 4.0 g/dL      Total Bilirubin 0.36 mg/dL      eGFR 121 ml/min/1.73sq m     Narrative:      National Kidney Disease Foundation guidelines for Chronic Kidney Disease (CKD):     Stage 1 with normal or high GFR (GFR >  90 mL/min/1.73 square meters)    Stage 2 Mild CKD (GFR = 60-89 mL/min/1.73 square meters)    Stage 3A Moderate CKD (GFR = 45-59 mL/min/1.73 square meters)    Stage 3B Moderate CKD (GFR = 30-44 mL/min/1.73 square meters)    Stage 4 Severe CKD (GFR = 15-29 mL/min/1.73 square meters)    Stage 5 End Stage CKD (GFR <15 mL/min/1.73 square meters)  Note: GFR calculation is accurate only with a steady state creatinine    CBC and differential [[id number]] Collected: 03/07/25 1133    Lab Status: Final result Specimen: Blood from Arm, Right Updated: 03/07/25 1144     WBC 10.14 Thousand/uL      RBC 4.48 Million/uL      Hemoglobin 12.9 g/dL      Hematocrit 38.2 %      MCV 85 fL      MCH 28.8 pg      MCHC 33.8 g/dL      RDW 13.5 %      MPV 10.2 fL      Platelets 253 Thousands/uL      nRBC 0 /100 WBCs      Segmented % 67 %      Immature Grans % 0 %      Lymphocytes % 23 %      Monocytes % 8 %      Eosinophils Relative 2 %      Basophils Relative 0 %      Absolute Neutrophils 6.69 Thousands/µL      Absolute Immature Grans 0.03 Thousand/uL      Absolute Lymphocytes 2.31 Thousands/µL      Absolute Monocytes 0.84 Thousand/µL      Eosinophils Absolute 0.23 Thousand/µL      Basophils Absolute 0.04 Thousands/µL               ED Course  ED Course as of 03/07/25 1318   Fri Mar 07, 2025   1153  on BSUS         Critical Care Time  Procedures

## 2025-03-08 LAB — BACTERIA UR CULT: NORMAL

## 2025-03-11 ENCOUNTER — ROUTINE PRENATAL (OUTPATIENT)
Dept: OBGYN CLINIC | Facility: CLINIC | Age: 30
End: 2025-03-11

## 2025-03-11 VITALS
HEIGHT: 66 IN | SYSTOLIC BLOOD PRESSURE: 118 MMHG | WEIGHT: 191 LBS | DIASTOLIC BLOOD PRESSURE: 80 MMHG | BODY MASS INDEX: 30.7 KG/M2 | HEART RATE: 105 BPM

## 2025-03-11 DIAGNOSIS — Z20.2 POSSIBLE EXPOSURE TO STD: ICD-10-CM

## 2025-03-11 DIAGNOSIS — Z3A.10 10 WEEKS GESTATION OF PREGNANCY: ICD-10-CM

## 2025-03-11 DIAGNOSIS — Z34.91 PRENATAL CARE IN FIRST TRIMESTER: Primary | ICD-10-CM

## 2025-03-11 DIAGNOSIS — Z12.4 CERVICAL CANCER SCREENING: ICD-10-CM

## 2025-03-11 PROBLEM — Z00.00 ANNUAL PHYSICAL EXAM: Status: RESOLVED | Noted: 2022-02-16 | Resolved: 2025-03-11

## 2025-03-11 PROCEDURE — G0145 SCR C/V CYTO,THINLAYER,RESCR: HCPCS | Performed by: OBSTETRICS & GYNECOLOGY

## 2025-03-11 PROCEDURE — 99214 OFFICE O/P EST MOD 30 MIN: CPT | Performed by: OBSTETRICS & GYNECOLOGY

## 2025-03-11 PROCEDURE — 87491 CHLMYD TRACH DNA AMP PROBE: CPT | Performed by: OBSTETRICS & GYNECOLOGY

## 2025-03-11 PROCEDURE — 87591 N.GONORRHOEAE DNA AMP PROB: CPT | Performed by: OBSTETRICS & GYNECOLOGY

## 2025-03-11 RX ORDER — DIPHENHYDRAMINE HYDROCHLORIDE 25 MG/1
50 CAPSULE ORAL DAILY
COMMUNITY

## 2025-03-11 NOTE — PROGRESS NOTES
"Prenatal H&P   [facility]    Subjective:  Patient is a  here for initial prenatal H&P. This is an unintended pregnancy. Patient reports that her LMP was 2024. Patient is currently doing well. She is here with her fiance. Her previous pregnancies have been complicated by shoulder dystocia. She currently works from home. She has a support system at home. She does not have a known family history of inheritable conditions such as physical or intellectual disabilities, birth defects, blood disorders, heart or neural tube defects. She denies recent travel. She denies use of nicotine, EtOH or recreational drug use.  She went to the ED with bleeding on .  She had brown spotting on  which has stopped.     OB History    Para Term  AB Living   2 1 1 0 0 1   SAB IAB Ectopic Multiple Live Births   0 0 0  1      # Outcome Date GA Lbr Jose/2nd Weight Sex Type Anes PTL Lv   2 Current            1 Term 23 39w6d / 00:52 3772 g (8 lb 5.1 oz) M Vag-Spont EPI N STANLEY      Birth Comments: mec delivery; tongue-tied; Latvian to buttocks      Complications: Shoulder Dystocia         Objective:   /80 (BP Location: Right arm, Patient Position: Sitting, Cuff Size: Adult)   Pulse 105   Ht 5' 6\" (1.676 m)   Wt 86.6 kg (191 lb)   LMP 2024 (Exact Date)   BMI 30.83 kg/m²     General:   alert and oriented, in no acute distress, alert, appears stated age, and cooperative   Heart: regular rate and rhythm, S1, S2 normal, no murmur, click, rub or gallop   Lungs: clear to auscultation bilaterally   Abdomen: soft, non-tender, without masses or organomegaly   Vulva: normal   Vagina: normal mucosa   Cervix: multiparous appearance           TA US       Assessment and Plan:  1. LMP 25 with an TRACY 10/06/25  2. TVUS showed EGA  8 wk 5 days with an TRACY 10/04/25  3. Pap smear collected  4. GC/CT collected.  5. Prenatal labs -needs to do, pt aware.  6. Postpartum: patient " plans on  breastfeeding. Different methods of contraception were discussed with patient, including progesterone only oral pills, depo provera, nexplanon, mirena, and paragard. Patient would like to use : unsure.  7. Delivery consent form to be signed at 28 weeks.  8. [facility] 03/25/25  9. Labor expectations discussed with patient, including appointment schedule, nutrition, weight gain, sexual intercourse, and nausea and vomiting.   10. RTC in 4 weeks. Continue PNV QD.

## 2025-03-14 ENCOUNTER — APPOINTMENT (OUTPATIENT)
Dept: LAB | Facility: CLINIC | Age: 30
End: 2025-03-14
Payer: COMMERCIAL

## 2025-03-14 DIAGNOSIS — Z32.01 POSITIVE PREGNANCY TEST: ICD-10-CM

## 2025-03-14 DIAGNOSIS — Z3A.08 8 WEEKS GESTATION OF PREGNANCY: ICD-10-CM

## 2025-03-14 LAB
ABO GROUP BLD: NORMAL
BACTERIA UR QL AUTO: NORMAL /HPF
BASOPHILS # BLD AUTO: 0.04 THOUSANDS/ÂΜL (ref 0–0.1)
BASOPHILS NFR BLD AUTO: 0 % (ref 0–1)
BILIRUB UR QL STRIP: NEGATIVE
BLD GP AB SCN SERPL QL: NEGATIVE
C TRACH DNA SPEC QL NAA+PROBE: NEGATIVE
CLARITY UR: CLEAR
COLOR UR: YELLOW
EOSINOPHIL # BLD AUTO: 0.28 THOUSAND/ÂΜL (ref 0–0.61)
EOSINOPHIL NFR BLD AUTO: 3 % (ref 0–6)
ERYTHROCYTE [DISTWIDTH] IN BLOOD BY AUTOMATED COUNT: 13.5 % (ref 11.6–15.1)
GLUCOSE UR STRIP-MCNC: NEGATIVE MG/DL
HCT VFR BLD AUTO: 39 % (ref 34.8–46.1)
HGB BLD-MCNC: 13 G/DL (ref 11.5–15.4)
HGB UR QL STRIP.AUTO: NEGATIVE
IMM GRANULOCYTES # BLD AUTO: 0.04 THOUSAND/UL (ref 0–0.2)
IMM GRANULOCYTES NFR BLD AUTO: 0 % (ref 0–2)
KETONES UR STRIP-MCNC: NEGATIVE MG/DL
LEUKOCYTE ESTERASE UR QL STRIP: NEGATIVE
LYMPHOCYTES # BLD AUTO: 2.42 THOUSANDS/ÂΜL (ref 0.6–4.47)
LYMPHOCYTES NFR BLD AUTO: 24 % (ref 14–44)
MCH RBC QN AUTO: 29.1 PG (ref 26.8–34.3)
MCHC RBC AUTO-ENTMCNC: 33.3 G/DL (ref 31.4–37.4)
MCV RBC AUTO: 87 FL (ref 82–98)
MONOCYTES # BLD AUTO: 0.64 THOUSAND/ÂΜL (ref 0.17–1.22)
MONOCYTES NFR BLD AUTO: 6 % (ref 4–12)
N GONORRHOEA DNA SPEC QL NAA+PROBE: NEGATIVE
NEUTROPHILS # BLD AUTO: 6.59 THOUSANDS/ÂΜL (ref 1.85–7.62)
NEUTS SEG NFR BLD AUTO: 67 % (ref 43–75)
NITRITE UR QL STRIP: NEGATIVE
NON-SQ EPI CELLS URNS QL MICRO: NORMAL /HPF
NRBC BLD AUTO-RTO: 0 /100 WBCS
PH UR STRIP.AUTO: 6.5 [PH]
PLATELET # BLD AUTO: 254 THOUSANDS/UL (ref 149–390)
PMV BLD AUTO: 10.9 FL (ref 8.9–12.7)
PROT UR STRIP-MCNC: ABNORMAL MG/DL
RBC # BLD AUTO: 4.46 MILLION/UL (ref 3.81–5.12)
RBC #/AREA URNS AUTO: NORMAL /HPF
RH BLD: POSITIVE
RUBV IGG SERPL IA-ACNC: 57.7 IU/ML
SP GR UR STRIP.AUTO: 1.02 (ref 1–1.03)
SPECIMEN EXPIRATION DATE: NORMAL
UROBILINOGEN UR STRIP-ACNC: <2 MG/DL
WBC # BLD AUTO: 10.01 THOUSAND/UL (ref 4.31–10.16)
WBC #/AREA URNS AUTO: NORMAL /HPF

## 2025-03-14 PROCEDURE — 86780 TREPONEMA PALLIDUM: CPT

## 2025-03-14 PROCEDURE — 87340 HEPATITIS B SURFACE AG IA: CPT

## 2025-03-14 PROCEDURE — 81001 URINALYSIS AUTO W/SCOPE: CPT

## 2025-03-14 PROCEDURE — 86900 BLOOD TYPING SEROLOGIC ABO: CPT

## 2025-03-14 PROCEDURE — 87086 URINE CULTURE/COLONY COUNT: CPT

## 2025-03-14 PROCEDURE — 85025 COMPLETE CBC W/AUTO DIFF WBC: CPT

## 2025-03-14 PROCEDURE — 86803 HEPATITIS C AB TEST: CPT

## 2025-03-14 PROCEDURE — 86706 HEP B SURFACE ANTIBODY: CPT

## 2025-03-14 PROCEDURE — 86762 RUBELLA ANTIBODY: CPT

## 2025-03-14 PROCEDURE — 86850 RBC ANTIBODY SCREEN: CPT

## 2025-03-14 PROCEDURE — 87389 HIV-1 AG W/HIV-1&-2 AB AG IA: CPT

## 2025-03-14 PROCEDURE — 86901 BLOOD TYPING SEROLOGIC RH(D): CPT

## 2025-03-14 PROCEDURE — 36415 COLL VENOUS BLD VENIPUNCTURE: CPT

## 2025-03-15 LAB
BACTERIA UR CULT: NORMAL
HBV SURFACE AB SER-ACNC: 27.5 MIU/ML
HBV SURFACE AG SER QL: NORMAL
HCV AB SER QL: NORMAL
HIV 1+2 AB+HIV1 P24 AG SERPL QL IA: NORMAL
TREPONEMA PALLIDUM IGG+IGM AB [PRESENCE] IN SERUM OR PLASMA BY IMMUNOASSAY: NORMAL

## 2025-03-20 LAB
LAB AP GYN PRIMARY INTERPRETATION: NORMAL
LAB AP LMP: NORMAL
Lab: NORMAL

## 2025-03-25 ENCOUNTER — ROUTINE PRENATAL (OUTPATIENT)
Facility: HOSPITAL | Age: 30
End: 2025-03-25
Payer: COMMERCIAL

## 2025-03-25 VITALS
WEIGHT: 196.4 LBS | SYSTOLIC BLOOD PRESSURE: 128 MMHG | DIASTOLIC BLOOD PRESSURE: 80 MMHG | BODY MASS INDEX: 30.83 KG/M2 | HEIGHT: 67 IN | HEART RATE: 102 BPM

## 2025-03-25 DIAGNOSIS — Z32.01 POSITIVE PREGNANCY TEST: ICD-10-CM

## 2025-03-25 DIAGNOSIS — Z36.82 ENCOUNTER FOR (NT) NUCHAL TRANSLUCENCY SCAN: Primary | ICD-10-CM

## 2025-03-25 DIAGNOSIS — O09.299 HISTORY OF SHOULDER DYSTOCIA IN PRIOR PREGNANCY, CURRENTLY PREGNANT: ICD-10-CM

## 2025-03-25 DIAGNOSIS — Z3A.12 12 WEEKS GESTATION OF PREGNANCY: ICD-10-CM

## 2025-03-25 DIAGNOSIS — O99.211 OBESITY AFFECTING PREGNANCY IN FIRST TRIMESTER, UNSPECIFIED OBESITY TYPE: ICD-10-CM

## 2025-03-25 PROCEDURE — 76813 OB US NUCHAL MEAS 1 GEST: CPT | Performed by: STUDENT IN AN ORGANIZED HEALTH CARE EDUCATION/TRAINING PROGRAM

## 2025-03-25 PROCEDURE — 76801 OB US < 14 WKS SINGLE FETUS: CPT | Performed by: STUDENT IN AN ORGANIZED HEALTH CARE EDUCATION/TRAINING PROGRAM

## 2025-03-25 PROCEDURE — 99203 OFFICE O/P NEW LOW 30 MIN: CPT | Performed by: STUDENT IN AN ORGANIZED HEALTH CARE EDUCATION/TRAINING PROGRAM

## 2025-03-25 NOTE — LETTER
2025     Terri Millan MD  [address]  [address]  [place] [address]    Patient: Yaneth Sharma   YOB: 1995   Date of Visit: 3/25/2025       Dear Dr. Millan:    Thank you for referring Yaneth Sharma to me for evaluation. Below are my notes for this consultation.    If you have questions, please do not hesitate to call me. I look forward to following your patient along with you.         Sincerely,        Jenny Sandoval MD        CC: No Recipients    Jenny Sandoval MD  3/25/2025  1:57 PM  Sign when Signing Visit  This patient received  care under my supervision on 25 at 12w1d gestational age at [facility].  The note is contained in the ultrasound report located under OB Procedures tab in Epic.  Please call our office at [phone] with questions.  -Jenny Sandoval MD        Brenna Reyes  3/25/2025  1:50 PM  Sign when Signing Visit  Patient has [employer]  insurance. Patient was offered the self pay option through [facility] but declined. Explained to patient her insurance requires a prior authorization before [facility] NIPS can be drawn. In-basket message routed to Maternal Fetal Medicine clinical pool to complete prior authorization. Our office will contact the patient within 10-14 business days with the status of authorization. If patient does not hear back from our Maternal Fetal Medicine office after 14 business days to please call [phone].     Patient is aware if this test is drawn without prior authorization she may be responsible for full cost of test. Insurance Authorization is not a guarantee of payment and final determination is based on your member benefits, appropriateness of service provided and eligibility at time of services rendered and claim received.

## 2025-03-25 NOTE — PROGRESS NOTES
This patient received  care under my supervision on 25 at 12w1d gestational age at [facility].  The note is contained in the ultrasound report located under OB Procedures tab in Epic.  Please call our office at [phone] with questions.  -Jenny Sandoval MD

## 2025-03-25 NOTE — PROGRESS NOTES
Patient has [employer]  insurance. Patient was offered the self pay option through [facility] but declined. Explained to patient her insurance requires a prior authorization before [facility] NIPS can be drawn. In-basket message routed to Maternal Fetal Medicine clinical pool to complete prior authorization. Our office will contact the patient within 10-14 business days with the status of authorization. If patient does not hear back from our Maternal Fetal Medicine office after 14 business days to please call [phone].     Patient is aware if this test is drawn without prior authorization she may be responsible for full cost of test. Insurance Authorization is not a guarantee of payment and final determination is based on your member benefits, appropriateness of service provided and eligibility at time of services rendered and claim received.

## 2025-03-28 ENCOUNTER — DOCUMENTATION (OUTPATIENT)
Dept: PERINATAL CARE | Facility: CLINIC | Age: 30
End: 2025-03-28

## 2025-03-28 NOTE — PROGRESS NOTES
Initiated  prior authorization for CPT CODE 84576 via [employer] portal - upload clinicals as requested from [employer] company .    Received pending authorization number of [id number].    Pending authorization request was scanned into [employer] .

## 2025-03-31 ENCOUNTER — TELEPHONE (OUTPATIENT)
Dept: PERINATAL CARE | Facility: CLINIC | Age: 30
End: 2025-03-31

## 2025-03-31 NOTE — TELEPHONE ENCOUNTER
Yaneth made aware of the following information:    As per [employer] [facility] -no prior authorization is required for NIPT 66216.    Phone call to patient and notified insurance plan does not require authorization for NIPT.    Encouraged patient to determine if she met her lab deductible requirement with insurance plan.    For complete NIPT billing information patient can visit  [email or web address]  or call [employer]  @ [phone]       Yaneth will come on 04/04/25 @ 345 pm to the [facility] for her Nurse Visit appointment     Certification # [id number]

## 2025-04-04 ENCOUNTER — CLINICAL SUPPORT (OUTPATIENT)
Facility: HOSPITAL | Age: 30
End: 2025-04-04
Payer: COMMERCIAL

## 2025-04-04 DIAGNOSIS — Z36.9 ENCOUNTER FOR ANTENATAL SCREENING: ICD-10-CM

## 2025-04-04 DIAGNOSIS — Z33.1 PREGNANT STATE, INCIDENTAL: ICD-10-CM

## 2025-04-04 DIAGNOSIS — Z3A.13 13 WEEKS GESTATION OF PREGNANCY: Primary | ICD-10-CM

## 2025-04-04 PROCEDURE — 36415 COLL VENOUS BLD VENIPUNCTURE: CPT | Performed by: OBSTETRICS & GYNECOLOGY

## 2025-04-04 NOTE — PROGRESS NOTES
Patient chose to have [facility] [id number] Non-Invasive Prenatal Screen [id number] PLUS w/ SCA, WITH fetal sex.  Patient choose to be billed through insurance.     Patient given brochure and is aware [facility] will contact patient's insurance and coordinate coverage.  Provided [facility] contact information. General inquiries [phone], Cost estimates [phone] and [facility] Billing [phone]. Website [email or web address].     Blood collection tubes labeled with patient identifiers (name, medical record number, and date of birth).     Filled out [facility] order form. Patient chose to have blood drawn in our office at time of visit. NIPS was drawn from left arm with a butterfly needle by Brenna RADFORD MA.       If patient chose to have blood work drawn at a [facility] lab we requested patient notify MFM (via phone call or Vinveli message) when blood collected so office can follow up on results.       Maternal Fetal Medicine will have results in approximately 5-7 business days and will call patient or notify via Vinveli.  Patient aware viewing lab result online will reveal fetal sex if ordered.    Patient verbalized understanding of all instructions and no questions at this time.

## 2025-04-08 ENCOUNTER — ROUTINE PRENATAL (OUTPATIENT)
Dept: OBGYN CLINIC | Facility: CLINIC | Age: 30
End: 2025-04-08

## 2025-04-08 VITALS
DIASTOLIC BLOOD PRESSURE: 78 MMHG | SYSTOLIC BLOOD PRESSURE: 117 MMHG | HEIGHT: 67 IN | WEIGHT: 200 LBS | BODY MASS INDEX: 31.39 KG/M2 | HEART RATE: 94 BPM

## 2025-04-08 DIAGNOSIS — Z3A.14 14 WEEKS GESTATION OF PREGNANCY: ICD-10-CM

## 2025-04-08 DIAGNOSIS — O09.299 HISTORY OF SHOULDER DYSTOCIA IN PRIOR PREGNANCY, CURRENTLY PREGNANT: Primary | ICD-10-CM

## 2025-04-08 PROCEDURE — 99213 OFFICE O/P EST LOW 20 MIN: CPT | Performed by: NURSE PRACTITIONER

## 2025-04-08 NOTE — PROGRESS NOTES
[facility]  OB/GYN prenatal visit    S: 29 y.o.  14w1d here for PN visit. She has no  obstetric complaints, including pelvic pain, contractions, vaginal bleeding, loss of fluid, or decreased fetal movement.     O:  Vitals:    25 1644   BP: 117/78   Pulse: 94       Gen: no acute distress, nonlabored breathing  Fundal Height (cm): 15 cm  Fetal Heart Rate: 156  Abdomen soft, NT  A/P:      IUP at 14w1d  No obstetric complaints today  TWG: + 8 lbs  since 8 wks gestation. Weight gain  recommended in pregnancy 11 to 20 lbs  in pregnancy  US 3/25/2025 placental cyst present, level 2 on  25   Waiting results of materniT 21 plus core+  SCA  Reviewed MSAFP- pt desires and ordered to get done in 2 weeks  Desires   Discussed  labor precautions and fetal kick counts    Return to clinic in 4 weeks    G2 Problems (from 25 to present)       Problem Noted Diagnosed Resolved    14 weeks gestation of pregnancy 2025 by Terri Millan MD  No    Overview Addendum 2025  5:06 PM by ARIANE Lyn   Overview:  Labs  Pap smear 3/11/25 [negative]; GC/CT neg  Prenatal panel wnl  28w labs [ to be continued ]   Rh positive   Tdap vaccine: [ due at 28 weeks ]   Genetic screening [  awaiting results]   MSAFP ordered  Recommended weight gain 11 - 20 lbs  Contraception: [ unsure ]   Feeding plan: breast/bottle [ ]   Birth plan:  with epidural [ ]   Delivery consent: to be signed at 28w[ ]   Ultrasounds: 3/25/2025, placenta cyst present scheduled for level 2 ultrasound 25                    ARIANE Lyn  2025  5:04 PM

## 2025-04-09 ENCOUNTER — RESULTS FOLLOW-UP (OUTPATIENT)
Age: 30
End: 2025-04-09

## 2025-04-09 LAB
CFDNA.FET/CFDNA.TOTAL SFR FETUS: NORMAL %
CFDNA.FET/CFDNA.TOTAL SFR FETUS: NORMAL %
CITATION REF LAB TEST: NORMAL
FET 13+18+21+X+Y ANEUP PLAS.CFDNA: NEGATIVE
FET CHR 21 TS PLAS.CFDNA QL: NEGATIVE
FET CHR 21 TS PLAS.CFDNA QL: NEGATIVE
FET MS X RISK WBC.DNA+CFDNA QL: NOT DETECTED
FET SEX PLAS.CFDNA DOSAGE CFDNA: NORMAL
FET TS 13 RISK PLAS.CFDNA QL: NEGATIVE
FET X + Y ANEUP RISK PLAS.CFDNA SEQ-IMP: NOT DETECTED
GA EST FROM CONCEPTION DATE: NORMAL D
GESTATIONAL AGE > 9:: YES
LAB DIRECTOR NAME PROVIDER: NORMAL
LABORATORY COMMENT REPORT: NORMAL
LIMITATIONS OF THE TEST: NORMAL
NEGATIVE PREDICTIVE VALUE: NORMAL
PERFORMANCE CHARACTERISTICS: NORMAL
POSITIVE PREDICTIVE VALUE: NORMAL
REF LAB TEST METHOD: NORMAL
SL AMB NOTE:: NORMAL
TEST PERFORMANCE INFO SPEC: NORMAL

## 2025-05-05 PROBLEM — Z3A.18 18 WEEKS GESTATION OF PREGNANCY: Status: ACTIVE | Noted: 2025-02-27

## 2025-05-06 ENCOUNTER — ROUTINE PRENATAL (OUTPATIENT)
Dept: OBGYN CLINIC | Facility: CLINIC | Age: 30
End: 2025-05-06

## 2025-05-06 VITALS
BODY MASS INDEX: 32.08 KG/M2 | SYSTOLIC BLOOD PRESSURE: 119 MMHG | HEART RATE: 103 BPM | HEIGHT: 67 IN | WEIGHT: 204.4 LBS | RESPIRATION RATE: 18 BRPM | DIASTOLIC BLOOD PRESSURE: 81 MMHG

## 2025-05-06 DIAGNOSIS — Z3A.18 18 WEEKS GESTATION OF PREGNANCY: Primary | ICD-10-CM

## 2025-05-06 DIAGNOSIS — Z34.82 PRENATAL CARE, SUBSEQUENT PREGNANCY IN SECOND TRIMESTER: ICD-10-CM

## 2025-05-06 DIAGNOSIS — O09.299 HISTORY OF SHOULDER DYSTOCIA IN PRIOR PREGNANCY, CURRENTLY PREGNANT: ICD-10-CM

## 2025-05-06 NOTE — PROGRESS NOTES
OB/GYN Prenatal Visit    ASSESSMENT / PLAN:  1. 18 weeks gestation of pregnancy  Assessment & Plan:  Assessment and Plan:  No obstetric complaints  Discussed  labor precautions and fetal kick counts  Encouraged completion of MSAFP  Early 1 hr glucola ordered  Return to clinic in 4 weeks   2. History of shoulder dystocia in prior pregnancy, currently pregnant  Assessment & Plan:  -  reviewed with patient hat she could have a medically indicated  delivery for history of shoulder dystocia. She also received counseling by M. She understands the risks of SD, injury to the UE, hypoxic injury, or even fetal death. At this time she would like to continue with plan for a vaginal delivery, but wants to continue to discuss this throughout her pregnancy  - MFM states and 3rd trimester growth scan can be considered to help with delivery planning.   - early glucola ordered to help reduce risk of fetal macrosomia   Orders:  -     Glucose, 1H PG; Future  3. Prenatal care, subsequent pregnancy in second trimester        SUBJECTIVE:  Yaneth Sharma is a 29 y.o.  at  here for prenatal visit.  She has no obstetric complaints and denies pelvic pain, cramping/contractions, vaginal bleeding, loss of fluid. Felt first movement yesterday which she was excited about.     Discussed history of SD. Patient expressed she would like to have a vaginal delivery and not a  delivery. Discussed risks, benefits, alterative treatment options.     OBJECTIVE:  Vitals:    25 1604   BP: 119/81   Pulse: 103   Resp: 18     FHT: 150 bpm  Fundal height: 19 cm  SVE: deferred    Physical Exam:    General: Well appearing, no distress  Respiratory: Unlabored breathing  Cardiovascular: Regular rate.  Abdomen: Soft, gravid, nontender  Fundal Height: Appropriate for gestational age.  Extremities: Warm and well perfused.  Non tender.      Terri Millan MD  2025  4:39 PM

## 2025-05-06 NOTE — ASSESSMENT & PLAN NOTE
-  reviewed with patient hat she could have a medically indicated  delivery for history of shoulder dystocia. She also received counseling by M. She understands the risks of SD, injury to the UE, hypoxic injury, or even fetal death. At this time she would like to continue with plan for a vaginal delivery, but wants to continue to discuss this throughout her pregnancy  - MFM states and 3rd trimester growth scan can be considered to help with delivery planning.   - early glucola ordered to help reduce risk of fetal macrosomia

## 2025-05-06 NOTE — ASSESSMENT & PLAN NOTE
Assessment and Plan:  No obstetric complaints  Discussed  labor precautions and fetal kick counts  Encouraged completion of MSAFP  Early 1 hr glucstephen ordered  Return to clinic in 4 weeks

## 2025-05-22 ENCOUNTER — ANCILLARY PROCEDURE (OUTPATIENT)
Facility: HOSPITAL | Age: 30
End: 2025-05-22
Payer: COMMERCIAL

## 2025-05-22 ENCOUNTER — ROUTINE PRENATAL (OUTPATIENT)
Facility: HOSPITAL | Age: 30
End: 2025-05-22
Attending: STUDENT IN AN ORGANIZED HEALTH CARE EDUCATION/TRAINING PROGRAM
Payer: COMMERCIAL

## 2025-05-22 VITALS
DIASTOLIC BLOOD PRESSURE: 76 MMHG | HEART RATE: 87 BPM | BODY MASS INDEX: 32.49 KG/M2 | SYSTOLIC BLOOD PRESSURE: 120 MMHG | WEIGHT: 207 LBS | HEIGHT: 67 IN

## 2025-05-22 DIAGNOSIS — Z3A.20 20 WEEKS GESTATION OF PREGNANCY: ICD-10-CM

## 2025-05-22 DIAGNOSIS — Z36.86 ENCOUNTER FOR ANTENATAL SCREENING FOR CERVICAL LENGTH: ICD-10-CM

## 2025-05-22 DIAGNOSIS — O09.299 HISTORY OF SHOULDER DYSTOCIA IN PRIOR PREGNANCY, CURRENTLY PREGNANT: ICD-10-CM

## 2025-05-22 DIAGNOSIS — Z36.3 ENCOUNTER FOR ANTENATAL SCREENING FOR MALFORMATION: Primary | ICD-10-CM

## 2025-05-22 PROCEDURE — 76817 TRANSVAGINAL US OBSTETRIC: CPT

## 2025-05-22 PROCEDURE — 76811 OB US DETAILED SNGL FETUS: CPT

## 2025-05-22 NOTE — PROGRESS NOTES
"[facility]: Yaneth Sharma was seen today for anatomic survey and cervical length screening ultrasound. See ultrasound report under \"Imaging\" tab.     Her pregnancy is notable for class I obesity and prior shoulder dystocia. She had a low risk NIPT. MSAFP and early Glucola are ordered.     Vitals:    25 1559   BP: 120/76   Pulse: 87     Problem List Items Addressed This Visit       History of shoulder dystocia in prior pregnancy, currently pregnant    20 weeks gestation of pregnancy    There is a single live intrauterine pregnancy with size equivalent to dates. No gross anomalies were identified. The survey is incomplete. Amniotic fluid is within normal limits. Transvaginal ultrasound was performed in conjunction with a transabdominal ultrasound to better visualize the cervix. Cervical length is within normal limits indicating low risk for spontaneous  birth.                Other Visit Diagnoses         Encounter for  screening for malformation    -  Primary      Encounter for  screening for cervical length              She will return at 32 weeks for growth and completion of the anatomic survey.    Evaluation/Management:  MDM:   I. Diagnoses/Problems addressed:  Minimal  II.  Data: Limited  III.  Risk of morbidity: Minimal    Please don't hesitate to contact our office with any concerns or questions.    -Jenny Sandoval MD  "

## 2025-05-22 NOTE — ASSESSMENT & PLAN NOTE
There is a single live intrauterine pregnancy with size equivalent to dates. No gross anomalies were identified. The survey is incomplete. Amniotic fluid is within normal limits. Transvaginal ultrasound was performed in conjunction with a transabdominal ultrasound to better visualize the cervix. Cervical length is within normal limits indicating low risk for spontaneous  birth.

## 2025-05-22 NOTE — PROGRESS NOTES
Ultrasound Probe Disinfection    A transvaginal ultrasound was performed.   Prior to use, disinfection was performed with High Level Disinfection Process ([employer]).  Probe serial number A [id number] was used.    Christel Mccord  05/22/25  3:56 PM

## 2025-05-23 ENCOUNTER — APPOINTMENT (OUTPATIENT)
Dept: LAB | Facility: CLINIC | Age: 30
End: 2025-05-23
Payer: COMMERCIAL

## 2025-05-23 DIAGNOSIS — O09.299 HISTORY OF SHOULDER DYSTOCIA IN PRIOR PREGNANCY, CURRENTLY PREGNANT: ICD-10-CM

## 2025-05-23 DIAGNOSIS — Z3A.14 14 WEEKS GESTATION OF PREGNANCY: ICD-10-CM

## 2025-05-23 LAB — GLUCOSE 1H P 50 G GLC PO SERPL-MCNC: 148 MG/DL (ref 70–134)

## 2025-05-23 PROCEDURE — 82105 ALPHA-FETOPROTEIN SERUM: CPT

## 2025-05-23 PROCEDURE — 82950 GLUCOSE TEST: CPT

## 2025-05-23 PROCEDURE — 36415 COLL VENOUS BLD VENIPUNCTURE: CPT

## 2025-05-27 ENCOUNTER — RESULTS FOLLOW-UP (OUTPATIENT)
Dept: OBGYN CLINIC | Facility: CLINIC | Age: 30
End: 2025-05-27

## 2025-05-27 LAB
2ND TRIMESTER 4 SCREEN SERPL-IMP: NORMAL
AFP ADJ MOM SERPL: 0.81
AFP INTERP AMN-IMP: NORMAL
AFP INTERP SERPL-IMP: NORMAL
AFP INTERP SERPL-IMP: NORMAL
AFP SERPL-MCNC: 46.3 NG/ML
AGE AT DELIVERY: 28 YR
GA METHOD: NORMAL
GA: 20.6 WEEKS
IDDM PATIENT QL: NO
MULTIPLE PREGNANCY: NO
NEURAL TUBE DEFECT RISK FETUS: NORMAL %

## 2025-06-02 ENCOUNTER — RESULTS FOLLOW-UP (OUTPATIENT)
Dept: OTHER | Facility: HOSPITAL | Age: 30
End: 2025-06-02

## 2025-06-03 ENCOUNTER — ROUTINE PRENATAL (OUTPATIENT)
Dept: OBGYN CLINIC | Facility: CLINIC | Age: 30
End: 2025-06-03

## 2025-06-03 VITALS
WEIGHT: 208 LBS | BODY MASS INDEX: 32.65 KG/M2 | DIASTOLIC BLOOD PRESSURE: 81 MMHG | SYSTOLIC BLOOD PRESSURE: 127 MMHG | HEART RATE: 102 BPM | HEIGHT: 67 IN

## 2025-06-03 DIAGNOSIS — Z34.82 PRENATAL CARE, SUBSEQUENT PREGNANCY IN SECOND TRIMESTER: ICD-10-CM

## 2025-06-03 DIAGNOSIS — O99.810 ABNORMAL GLUCOSE TOLERANCE IN PREGNANCY: Primary | ICD-10-CM

## 2025-06-03 DIAGNOSIS — Z3A.22 22 WEEKS GESTATION OF PREGNANCY: ICD-10-CM

## 2025-06-03 DIAGNOSIS — O09.299 HISTORY OF SHOULDER DYSTOCIA IN PRIOR PREGNANCY, CURRENTLY PREGNANT: ICD-10-CM

## 2025-06-03 DIAGNOSIS — M79.604 MUSCULOSKELETAL PAIN OF RIGHT LOWER EXTREMITY: ICD-10-CM

## 2025-06-03 NOTE — PROGRESS NOTES
OB/GYN Prenatal Visit    ASSESSMENT / PLAN:  1. Abnormal glucose tolerance in pregnancy  Assessment & Plan:  Early 1 hr 148  3 hr ordered   Orders:  -     Glucose JASPER 3HR 100GM PREG PTS; Future; Expected date: Collect anytime  2. Prenatal care, subsequent pregnancy in second trimester  3. History of shoulder dystocia in prior pregnancy, currently pregnant  4. 22 weeks gestation of pregnancy  Assessment & Plan:  Assessment and Plan:  No obstetric complaints  Discussed  labor precautions and fetal kick counts  Return to clinic in 4 weeks   3 hr glucola ordered   Reviewed AFP, cfDNA, early glucola results   5. Musculoskeletal pain of right lower extremity  -     Ambulatory Referral to Physical Therapy; Future        SUBJECTIVE:  Yaneth Sharma is a 29 y.o.  at 22w1d here for prenatal visit.  She has no obstetric complaints and denies pelvic pain, cramping/contractions, vaginal bleeding, loss of fluid, or decreased fetal movement. She has some round ligament pain on the right at times. Discussed belly band support. She has some gluteal pain on the right. Discussed PT    OBJECTIVE:  Vitals:    25 1559   BP: 127/81   Pulse: 102     FHT: 150 bpm  Fundal height: 20 cm  SVE: deferred    Physical Exam:    General: Well appearing, no distress  Respiratory: Unlabored breathing  Cardiovascular: Regular rate.  Abdomen: Soft, gravid, nontender  Fundal Height: Appropriate for gestational age.  Extremities: Warm and well perfused.  Non tender.      Terri Millan MD  6/3/2025  4:23 PM

## 2025-06-03 NOTE — ASSESSMENT & PLAN NOTE
Assessment and Plan:  No obstetric complaints  Discussed  labor precautions and fetal kick counts  Return to clinic in 4 weeks   3 hr glucola ordered   Reviewed AFP, cfDNA, early glucola results

## 2025-07-01 ENCOUNTER — CLINICAL SUPPORT (OUTPATIENT)
Age: 30
End: 2025-07-01

## 2025-07-01 DIAGNOSIS — Z32.2 ENCOUNTER FOR CHILDBIRTH INSTRUCTION: Primary | ICD-10-CM

## 2025-07-01 PROBLEM — Z3A.26 26 WEEKS GESTATION OF PREGNANCY: Status: ACTIVE | Noted: 2025-02-27

## 2025-07-01 NOTE — PROGRESS NOTES
[facility]  OB/GYN prenatal visit    S: 29 y.o.  26w1d here for PN visit. She has denies obstetric complaints, including pelvic pain, contractions, vaginal bleeding, loss of fluid, or decreased fetal movement. Baby is moving regularly     O:  Vitals:    25 1616   BP: 133/77   Pulse: (!) 52     Physical Exam     Well appearing and gravid  No acute distress  Heart RRR no murmurs or gallops   LCTA  No edema    Fetal heart rate: 148  Fundal height 26cm          A/P:      IUP at 26w1d  No obstetric complaints today  US 25, 9-day discrepancy,  growth scan at 32 wks  1 hr GTT was 148,  passed 3hr GTT, will add RPR and CBC, anemia panel, B+ blood type, to complete labs prior to next appt.   TWG: + 11 to 20 lbs recommended weight gain in pregnancy  Vaccinations: TDAP next visit   Contraception: ( unsure)  Breastfeeding: yes  Birth plan: discussed elective induction/repeat /TOLAC, patient opts for  with epidural is plan   Discussed  labor precautions and fetal kick counts    Return to clinic in 2 weeks

## 2025-07-02 ENCOUNTER — ROUTINE PRENATAL (OUTPATIENT)
Dept: OBGYN CLINIC | Facility: CLINIC | Age: 30
End: 2025-07-02

## 2025-07-02 ENCOUNTER — APPOINTMENT (OUTPATIENT)
Dept: LAB | Facility: CLINIC | Age: 30
End: 2025-07-02
Payer: COMMERCIAL

## 2025-07-02 VITALS
DIASTOLIC BLOOD PRESSURE: 77 MMHG | BODY MASS INDEX: 33.74 KG/M2 | HEART RATE: 52 BPM | HEIGHT: 67 IN | WEIGHT: 215 LBS | SYSTOLIC BLOOD PRESSURE: 133 MMHG

## 2025-07-02 DIAGNOSIS — Z3A.26 26 WEEKS GESTATION OF PREGNANCY: Primary | ICD-10-CM

## 2025-07-02 DIAGNOSIS — O99.810 ABNORMAL GLUCOSE TOLERANCE IN PREGNANCY: ICD-10-CM

## 2025-07-02 LAB
GLUCOSE 1H P 100 G GLC PO SERPL-MCNC: 135 MG/DL (ref 65–179)
GLUCOSE 2H P 100 G GLC PO SERPL-MCNC: 123 MG/DL (ref 65–154)
GLUCOSE 3H P 100 G GLC PO SERPL-MCNC: 97 MG/DL (ref 65–139)
GLUCOSE P FAST SERPL-MCNC: 79 MG/DL (ref 65–94)

## 2025-07-02 PROCEDURE — PNV: Performed by: OBSTETRICS & GYNECOLOGY

## 2025-07-02 PROCEDURE — 82951 GLUCOSE TOLERANCE TEST (GTT): CPT

## 2025-07-02 PROCEDURE — 36415 COLL VENOUS BLD VENIPUNCTURE: CPT

## 2025-07-02 PROCEDURE — 82952 GTT-ADDED SAMPLES: CPT

## 2025-07-08 ENCOUNTER — ROUTINE PRENATAL (OUTPATIENT)
Dept: OBGYN CLINIC | Facility: CLINIC | Age: 30
End: 2025-07-08

## 2025-07-08 VITALS
BODY MASS INDEX: 33.59 KG/M2 | SYSTOLIC BLOOD PRESSURE: 120 MMHG | WEIGHT: 214 LBS | HEIGHT: 67 IN | DIASTOLIC BLOOD PRESSURE: 81 MMHG | HEART RATE: 93 BPM

## 2025-07-08 DIAGNOSIS — N89.8 VAGINA ITCHING: ICD-10-CM

## 2025-07-08 DIAGNOSIS — Z34.82 PRENATAL CARE, SUBSEQUENT PREGNANCY IN SECOND TRIMESTER: Primary | ICD-10-CM

## 2025-07-08 DIAGNOSIS — Z3A.27 27 WEEKS GESTATION OF PREGNANCY: ICD-10-CM

## 2025-07-08 PROCEDURE — PNV: Performed by: OBSTETRICS & GYNECOLOGY

## 2025-07-08 RX ORDER — NYSTATIN AND TRIAMCINOLONE ACETONIDE 100000; 1 [USP'U]/G; MG/G
CREAM TOPICAL 2 TIMES DAILY
Qty: 30 G | Refills: 1 | Status: SHIPPED | OUTPATIENT
Start: 2025-07-08

## 2025-07-08 NOTE — PROGRESS NOTES
Yaneth Sharma presents today for routine OB visit at 27w1d.  Blood Pressure: 120/81  Wt=97.1 kg (214 lb); Body mass index is 33.52 kg/m².; TWG=Not found.   ; Fundal Height (cm): 29 cm  Abdomen: gravid, soft, non-tender.  She reports external vaginal itching.  Exam- erythema along the labia,    Denies uterine contractions or persistent cramping.  Denies vaginal bleeding or leaking of fluid.  Reports fetal movement.  Scheduled for ultrasound 08/15/2025.  Reviewed common discomforts of pregnancy in second trimester and warning signs.  Reminded to do labs.  Advised to continue medications and return in 1 week for routine 28 wk appt.      Current Outpatient Medications   Medication Instructions    nystatin-triamcinolone (MYCOLOG-II) cream Topical, 2 times daily    Prenatal Vit-Fe Fumarate-FA (PRENATAL 19 PO) Take by mouth    vitamin B-6 50 mg, Daily         G2 Problems (from 25 to present)       Problem Noted Diagnosed Resolved    27 weeks gestation of pregnancy 2025 by Terri Millan MD  No    Overview Addendum 6/3/2025  4:08 PM by Terri Millan MD   Overview:  Labs  Pap smear 3/11/25 [negative]; GC/CT neg  Prenatal panel wnl  28w labs [ to be continued ]   Rh positive   Tdap vaccine: [ due at 28 weeks ]   Genetic screening [ NIPS]   MSAFP wnl  Recommended weight gain 11 - 20 lbs  Contraception: [ unsure ]   Feeding plan: breast/bottle [ ]   Birth plan:  with epidural [ ]   Delivery consent: to be signed at 28w[ ]   Ultrasounds: 3/25/2025, placenta cyst present scheduled for level 2 ultrasound 25

## 2025-07-15 ENCOUNTER — ROUTINE PRENATAL (OUTPATIENT)
Dept: OBGYN CLINIC | Facility: CLINIC | Age: 30
End: 2025-07-15

## 2025-07-15 VITALS
HEIGHT: 67 IN | HEART RATE: 125 BPM | DIASTOLIC BLOOD PRESSURE: 83 MMHG | WEIGHT: 215 LBS | BODY MASS INDEX: 33.74 KG/M2 | SYSTOLIC BLOOD PRESSURE: 136 MMHG

## 2025-07-15 DIAGNOSIS — O09.299 HISTORY OF SHOULDER DYSTOCIA IN PRIOR PREGNANCY, CURRENTLY PREGNANT: ICD-10-CM

## 2025-07-15 DIAGNOSIS — O99.810 ABNORMAL GLUCOSE TOLERANCE IN PREGNANCY: ICD-10-CM

## 2025-07-15 DIAGNOSIS — Z3A.28 28 WEEKS GESTATION OF PREGNANCY: ICD-10-CM

## 2025-07-15 DIAGNOSIS — Z23 ENCOUNTER FOR IMMUNIZATION: Primary | ICD-10-CM

## 2025-07-15 PROCEDURE — 90471 IMMUNIZATION ADMIN: CPT | Performed by: OBSTETRICS & GYNECOLOGY

## 2025-07-15 PROCEDURE — PNV: Performed by: OBSTETRICS & GYNECOLOGY

## 2025-07-15 PROCEDURE — 90715 TDAP VACCINE 7 YRS/> IM: CPT | Performed by: OBSTETRICS & GYNECOLOGY

## 2025-07-22 PROBLEM — Z3A.28 28 WEEKS GESTATION OF PREGNANCY: Status: ACTIVE | Noted: 2025-02-27

## 2025-07-22 NOTE — ASSESSMENT & PLAN NOTE
Normal prenatal visit  Delivery consent signed  Reviewed  labor precautions  Reviewed fetal kick counts   RTC in 2 weeks

## 2025-07-22 NOTE — PROGRESS NOTES
3rd Trimester Visit  Name: Yaneth Sharma      : 1995      MRN: [id number]  Encounter Provider: Terri Millan MD  Encounter Date: 7/15/2025   Encounter department: [facility] [place]    29 y.o.  at 29w1d presenting for routine OB visit at 29w1d.:  Assessment & Plan  Encounter for immunization  Received Tdap  Orders:    Tdap Vaccine greater than or equal to 6yo    History of shoulder dystocia in prior pregnancy, currently pregnant  Received risks of recurrence again. Patient understands and wishes to proceed with plans for          28 weeks gestation of pregnancy  Normal prenatal visit  Delivery consent signed  Reviewed  labor precautions  Reviewed fetal kick counts   RTC in 2 weeks         Abnormal glucose tolerance in pregnancy  Dick 1 hr GTT elevated with normal 3 hr GTT. Reviewed these results with patient            Scheduled for ultrasound 8/15/2025.  Reviewed premature labor precautions and fetal kick counts.    History of Present Illness     She reports she is feeling.  Denies uterine contractions.  Denies vaginal bleeding or leaking of fluid.  Reports adequate fetal movement.     I consented the patient for delivery. The patient was counseled about the labor process. We discussed three routes of delivery including spontaneous vaginal delivery, operative vaginal delivery and  delivery. The patient was counseled on the risks of vaginal delivery including pain, vaginal/perineal tears and the need for repair at the bedside, infection and bleeding.  Was counseled on approximately 10% risk of another shoulder dystocia in this pregnancy.  She was again counseled on the risks of maternal and fetal morbidity and mortality.     Patient counseled on indications necessitating operative vaginal delivery including: maternal medical indications in which patient would need to avoid pushing, prolonged second stage and nonreassuring fetal heart tracing during  "second stage. Patient counseled on maternal risks of vaginal delivery including increase risk of tearing and hemorrhage. We also discussed fetal risks including intracranial hemorrhage, lacerations, nerve damage or fracture. Patient is aware that NICU providers would be available at the time of delivery to assess fetal status after delivery and need for resuscitation.      She was counseled on the indications for  section including: failed induction, arrest of dilation, persistent category II tracing and arrest of descent. She was counseled on the indications for emergent  section including evidence of worsening fetal or maternal status, and that there is a risk of general anesthesia. We discussed the risks of  including bleeding, infection, and injury to surrounding structures including the bowel and bladder.     She was counseled that there are medical and surgical methods to manage excessive postpartum bleeding. She was counseled that in the event of excessive blood loss, she may require blood transfusion which includes a small risk of blood borne diseases such as hepatitis and HIV. The patient is OK with receiving a blood transfusion if necessary.  The patient had an opportunity to ask questions and signed consent.         Current Outpatient Medications   Medication Instructions    nystatin-triamcinolone (MYCOLOG-II) cream Topical, 2 times daily    Prenatal Vit-Fe Fumarate-FA (PRENATAL 19 PO) Take by mouth    vitamin B-6 50 mg, Daily     Objective   /83 (BP Location: Left arm, Patient Position: Sitting, Cuff Size: Adult)   Pulse (!) 125   Ht 5' 7\" (1.702 m)   Wt 97.5 kg (215 lb)   LMP 2024 (Exact Date)   BMI 33.67 kg/m²      BP: Blood Pressure: 136/83  Wt: 97.5 kg (215 lb); Body mass index is 33.67 kg/m².      Abdomen: no tenderness, ”soft”,”non tender”; FH 28 cm   FHT: 130-140s bpm    Terri Millan MD  PGY-3 OB/GYN    "

## 2025-07-22 NOTE — ASSESSMENT & PLAN NOTE
Received risks of recurrence again. Patient understands and wishes to proceed with plans for

## 2025-07-29 PROBLEM — Z3A.30 30 WEEKS GESTATION OF PREGNANCY: Status: ACTIVE | Noted: 2025-02-27

## 2025-07-30 ENCOUNTER — ROUTINE PRENATAL (OUTPATIENT)
Dept: OBGYN CLINIC | Facility: CLINIC | Age: 30
End: 2025-07-30

## 2025-07-30 VITALS
DIASTOLIC BLOOD PRESSURE: 84 MMHG | WEIGHT: 217.6 LBS | BODY MASS INDEX: 34.08 KG/M2 | SYSTOLIC BLOOD PRESSURE: 130 MMHG | HEART RATE: 87 BPM | RESPIRATION RATE: 18 BRPM

## 2025-07-30 DIAGNOSIS — Z34.93 THIRD TRIMESTER PREGNANCY: Primary | ICD-10-CM

## 2025-07-30 DIAGNOSIS — Z3A.30 30 WEEKS GESTATION OF PREGNANCY: ICD-10-CM

## 2025-07-30 PROCEDURE — PNV: Performed by: NURSE PRACTITIONER

## 2025-08-01 ENCOUNTER — APPOINTMENT (OUTPATIENT)
Dept: LAB | Facility: CLINIC | Age: 30
End: 2025-08-01
Payer: COMMERCIAL

## 2025-08-01 DIAGNOSIS — Z3A.26 26 WEEKS GESTATION OF PREGNANCY: ICD-10-CM

## 2025-08-01 LAB
BASOPHILS # BLD AUTO: 0.02 THOUSANDS/ÂΜL (ref 0–0.1)
BASOPHILS NFR BLD AUTO: 0 % (ref 0–1)
EOSINOPHIL # BLD AUTO: 0.34 THOUSAND/ÂΜL (ref 0–0.61)
EOSINOPHIL NFR BLD AUTO: 3 % (ref 0–6)
ERYTHROCYTE [DISTWIDTH] IN BLOOD BY AUTOMATED COUNT: 13.3 % (ref 11.6–15.1)
HCT VFR BLD AUTO: 36.7 % (ref 34.8–46.1)
HGB BLD-MCNC: 12.5 G/DL (ref 11.5–15.4)
IMM GRANULOCYTES # BLD AUTO: 0.05 THOUSAND/UL (ref 0–0.2)
IMM GRANULOCYTES NFR BLD AUTO: 0 % (ref 0–2)
LYMPHOCYTES # BLD AUTO: 2.28 THOUSANDS/ÂΜL (ref 0.6–4.47)
LYMPHOCYTES NFR BLD AUTO: 19 % (ref 14–44)
MCH RBC QN AUTO: 29.4 PG (ref 26.8–34.3)
MCHC RBC AUTO-ENTMCNC: 34.1 G/DL (ref 31.4–37.4)
MCV RBC AUTO: 86 FL (ref 82–98)
MONOCYTES # BLD AUTO: 1.04 THOUSAND/ÂΜL (ref 0.17–1.22)
MONOCYTES NFR BLD AUTO: 9 % (ref 4–12)
NEUTROPHILS # BLD AUTO: 8 THOUSANDS/ÂΜL (ref 1.85–7.62)
NEUTS SEG NFR BLD AUTO: 69 % (ref 43–75)
NRBC BLD AUTO-RTO: 0 /100 WBCS
PLATELET # BLD AUTO: 231 THOUSANDS/UL (ref 149–390)
PMV BLD AUTO: 10.5 FL (ref 8.9–12.7)
RBC # BLD AUTO: 4.25 MILLION/UL (ref 3.81–5.12)
WBC # BLD AUTO: 11.73 THOUSAND/UL (ref 4.31–10.16)

## 2025-08-01 PROCEDURE — 36415 COLL VENOUS BLD VENIPUNCTURE: CPT

## 2025-08-01 PROCEDURE — 86780 TREPONEMA PALLIDUM: CPT

## 2025-08-02 LAB — TREPONEMA PALLIDUM IGG+IGM AB [PRESENCE] IN SERUM OR PLASMA BY IMMUNOASSAY: NORMAL

## 2025-08-13 ENCOUNTER — ROUTINE PRENATAL (OUTPATIENT)
Dept: OBGYN CLINIC | Facility: CLINIC | Age: 30
End: 2025-08-13

## 2025-08-13 PROBLEM — Z3A.32 32 WEEKS GESTATION OF PREGNANCY: Status: ACTIVE | Noted: 2025-02-27

## 2025-08-15 ENCOUNTER — ANCILLARY PROCEDURE (OUTPATIENT)
Facility: HOSPITAL | Age: 30
End: 2025-08-15
Attending: OBSTETRICS & GYNECOLOGY
Payer: COMMERCIAL

## 2025-08-15 ENCOUNTER — ULTRASOUND (OUTPATIENT)
Facility: HOSPITAL | Age: 30
End: 2025-08-15
Payer: COMMERCIAL

## 2025-08-15 PROBLEM — O36.63X0: Status: ACTIVE | Noted: 2025-08-15

## 2025-08-26 PROBLEM — Z3A.34 34 WEEKS GESTATION OF PREGNANCY: Status: ACTIVE | Noted: 2025-02-27
